# Patient Record
Sex: MALE | Race: WHITE | NOT HISPANIC OR LATINO | Employment: FULL TIME | URBAN - METROPOLITAN AREA
[De-identification: names, ages, dates, MRNs, and addresses within clinical notes are randomized per-mention and may not be internally consistent; named-entity substitution may affect disease eponyms.]

---

## 2018-08-14 ENCOUNTER — TRANSFERRED RECORDS (OUTPATIENT)
Dept: HEALTH INFORMATION MANAGEMENT | Facility: CLINIC | Age: 48
End: 2018-08-14

## 2018-08-29 ENCOUNTER — OFFICE VISIT (OUTPATIENT)
Dept: SLEEP MEDICINE | Facility: CLINIC | Age: 48
End: 2018-08-29
Payer: COMMERCIAL

## 2018-08-29 VITALS
OXYGEN SATURATION: 97 % | WEIGHT: 218.6 LBS | HEART RATE: 96 BPM | RESPIRATION RATE: 16 BRPM | DIASTOLIC BLOOD PRESSURE: 87 MMHG | BODY MASS INDEX: 28.97 KG/M2 | SYSTOLIC BLOOD PRESSURE: 135 MMHG | HEIGHT: 73 IN | TEMPERATURE: 97.9 F

## 2018-08-29 DIAGNOSIS — I63.9 ISCHEMIC STROKE (H): Primary | ICD-10-CM

## 2018-08-29 DIAGNOSIS — R03.0 BORDERLINE HYPERTENSION: ICD-10-CM

## 2018-08-29 DIAGNOSIS — F41.9 ANXIETY: ICD-10-CM

## 2018-08-29 DIAGNOSIS — R06.83 SNORING: ICD-10-CM

## 2018-08-29 DIAGNOSIS — R53.83 FATIGUE, UNSPECIFIED TYPE: ICD-10-CM

## 2018-08-29 PROCEDURE — 99204 OFFICE O/P NEW MOD 45 MIN: CPT | Performed by: PHYSICIAN ASSISTANT

## 2018-08-29 RX ORDER — ASPIRIN 81 MG/1
81 TABLET ORAL
COMMUNITY

## 2018-08-29 NOTE — PROGRESS NOTES
Sleep Consultation:    Date on this visit: 8/29/2018    Kai Allen is sent by No ref. provider found for a sleep consultation regarding NELLY, stroke.    Primary Physician: Teodoro Sams     Kai Allen reports snoring for 10 years or so and recently having a CVA. His medical history is otherwise non-contributory.    He has not thought of his sleep as problematic until his stroke. Even with paying closer attention to his sleep, he has not really felt he has concerns of his sleep.     He had a normal ESTRELLA on 8/17. LVEF was 60%.     Kai goes to sleep at 11:30 PM during the week. He wakes up at 7:00 AM to his kids. He falls asleep in 20 minutes.  Kai denies difficulty falling asleep.  He generally does not wake in the night unless one of his children need him.  On weekends, his sleep schedule is the same.  Patient gets an average of 7 hours of sleep per night. He feels he is a light sleeper. He recently started taking valerian root for anxiety. He has not noticed that it makes him sleepy or helped with anxiety.    Patient does use electronics in bed (was occasionally looking at iPad, has blue filters on but it has kept him up, has been cutting that out) and read in bed and does not watch TV in bed.     Kai does not do shift work.  He works day shifts.  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 He lives with his wife, son (8) and daughter (6).    Kai does snore some nights (3 nights per week on average) and snoring is moderately loud. His snoring is more noticeable if he has alcohol. Patient does have a regular bed partner. There is no report of gasping and snorting.  He does not have witnessed apneas most of the time. He may have some if he had alcohol. They never sleep separately.  Patient sleeps on his back, side and stomach. He snoring is worse if he sleeps on his back. He denies occasional snort arousals, morning dry mouth, morning headaches and morning confusion. Kai has  "occasional bruxism (uses a bite guard) and denies any sleep walking, sleep talking, dream enactment, sleep paralysis, cataplexy and hypnogogic/hypnopompic hallucinations.    Kai denies difficulty breathing through his nose, claustrophobia, reflux at night, heartburn and depression.  He has a lot of stress and possible anxiety related to work. He feels he is often in \"fight or flight\" mode.    Kai has not had a significant change in weight in the last few years.  Patient describes themself as a night person.  He would prefer to go to sleep at 12:00 AM and wake up at 8:00 AM.  Patient's [place] Sleepiness score 0/24 inconsistent with daytime sleepiness.  He had been tired, but has felt more refreshed in the last month since making some changes to his habits.    Kai denies taking naps. He takes no inadvertant naps. He denies dozing while reading in the evening. He denies dozing while driving.  Patient was counseled on the importance of driving while alert, to pull over if drowsy, or nap before getting into the vehicle if sleepy.   He was having 4-5 cups of coffee per day, but stopped caffeine (and alcohol) in the last month.     Allergies:    Allergies   Allergen Reactions     No Known Allergies        Medications:    Current Outpatient Prescriptions   Medication Sig Dispense Refill     aspirin 81 MG EC tablet Take 81 mg by mouth       finasteride (PROPECIA PRO-ANA) 1 MG tablet Take 1 tablet by mouth daily. 30 tablet 6     HYDROcodone-acetaminophen (NORCO) 5-325 MG per tablet Take 1-2 tablets by mouth every 4 hours as needed for other (Moderate to Severe Pain) (Patient not taking: Reported on 8/29/2018) 30 tablet 0     Probiotic Product (PROBIOTIC DAILY PO)        valerian root 530 MG CAPS capsule          Problem List:  Patient Active Problem List    Diagnosis Date Noted     CARDIOVASCULAR SCREENING; LDL GOAL LESS THAN 160 10/31/2010     Priority: Medium     Routine general medical examination at a health care " facility 2008     Priority: Medium     Umbilical hernia 10/06/2003     Priority: Medium     Problem list name updated by automated process. Provider to review          Past Medical/Surgical History:  Past Medical History:   Diagnosis Date     Alopecia, unspecified      Sebaceous cyst     L upper face     Umbilical hernia without mention of obstruction or gangrene 02     Past Surgical History:   Procedure Laterality Date     C NONSPECIFIC PROCEDURE  ~95    per pt - Hosp for viral inf/dehydration     C NONSPECIFIC PROCEDURE  high school    rt c lymph node bx     HERNIORRHAPHY VENTRAL N/A 2015    Procedure: HERNIORRHAPHY VENTRAL;  Surgeon: Clinton Wisdom MD;  Location: [facility]       Social History:  Social History     Social History     Marital status:      Spouse name: N/A     Number of children: 0     Years of education: N/A     Occupational History           Social History Main Topics     Smoking status: Never Smoker     Smokeless tobacco: Never Used     Alcohol use Yes      Comment: 4 beers /week     Drug use: Not on file     Sexual activity: Yes     Partners: Female      Comment:  for 2 years     Other Topics Concern     Exercise No     willbegin     Social History Narrative     2006       Family History:  Family History   Problem Relation Age of Onset     Diabetes Maternal Grandmother      C.A.D. Maternal Grandmother      Arthritis Maternal Grandmother      HEART DISEASE Maternal Grandmother      CABG at 81 yo     Cancer Maternal Grandfather       of lung ca smoker       Review of Systems:  A complete review of systems reviewed by me is negative with the exeption of what has been mentioned in the history of present illness.  CONSTITUTIONAL: NEGATIVE for weight gain/loss, fever, chills, sweats or night sweats, drug allergies.  EYES: NEGATIVE for changes in vision, blind spots, double vision.  ENT: NEGATIVE for ear pain, sore throat, sinus pain, post-nasal  "drip, runny nose, bloody nose  CARDIAC: NEGATIVE for fast heartbeats or fluttering in chest, chest pain or pressure, breathlessness when lying flat, swollen legs or swollen feet.  NEUROLOGIC: NEGATIVE headaches, weakness or numbness in the arms or legs.  DERMATOLOGIC: NEGATIVE for rashes, new moles or change in mole(s)  PULMONARY: NEGATIVE SOB at rest, SOB with activity, dry cough, productive cough, coughing up blood, wheezing or whistling when breathing.    GASTROINTESTINAL: NEGATIVE for nausea or vomitting, loose or watery stools, fat or grease in stools, constipation, abdominal pain, bowel movements black in color or blood noted.  GENITOURINARY: NEGATIVE for pain during urination, blood in urine, urinating more frequently than usual, irregular menstrual periods.  MUSCULOSKELETAL: NEGATIVE for muscle pain, bone or joint pain, swollen joints.  ENDOCRINE: NEGATIVE for increased thirst or urination, diabetes.  LYMPHATIC: NEGATIVE for swollen lymph nodes, lumps or bumps in the breasts or nipple discharge.  MENTAL HEALTH: POSITIVE for stress/anxiety    Physical Examination:  Vitals: /87  Pulse 96  Temp 97.9  F (36.6  C) (Oral)  Resp 16  Ht 1.854 m (6' 1\")  Wt 99.2 kg (218 lb 9.6 oz)  SpO2 97%  BMI 28.84 kg/m2  BMI= Body mass index is 28.84 kg/(m^2).    Neck Cir (cm): 39 cm    [place] Total Score 8/29/2018   Total score - [place] 0       SILAS Total Score: 5 (08/29/18 1254)    GENERAL APPEARANCE: healthy, alert, no distress and cooperative  EYES: Eyes grossly normal to inspection, PERRL, conjunctivae and sclerae normal and lids and lashes normal  HENT: nose and mouth without ulcers or lesions, crowded oropharynx and soft palate dependent  NECK: no adenopathy, no asymmetry, masses, or scars, thyroid normal to palpation and trachea midline and normal to palpation  RESP: lungs clear to auscultation - no rales, rhonchi or wheezes  CV: regular rates and rhythm, normal S1 S2, no S3 or S4, no murmur, click or rub " and no irregular beats  LYMPHATICS: no cervical adenopathy  MS: extremities normal- no gross deformities noted  NEURO: Normal strength and tone, mentation intact, speech normal and cranial nerves 2-12 intact  Mallampati Class: III.  Tonsillar Stage: 1  hidden by pillars.    Impression/Plan:    (I63.9) Ischemic stroke (H)  (primary encounter diagnosis), (R06.83) Snoring, (R03.0) Borderline hypertension, (R53.83) Fatigue, unspecified type  Comment: Mr. Allen presents at the request of Dr. Erwin for evaluation of possible sleep apnea in the setting of ischemic stroke which occurred one month ago. He does snore but not every night. Alcohol and sleeping supine exacerbate his snoring. His wife may have observed pauses in breathing if he has alcohol. He usually only has 1-2 drinks a couple times per week. He stopped all alcohol since his stroke. He has felt tired and unrefreshed from sleep in the past. He was having 4-5 cups of coffee per day to help get him through the day. He has felt a bit better since cutting out the coffee. He denies inadvertent dozing. His ESS is 0/24. He has had borderline hypertensive blood pressure readings, but has not been diagnosed with or treated for HTN. Other positive risk factors include; age 48 and make gender. His BMI is 28 and neck 39 cm, which does not confer high risk for apnea based on the STOP BANG scale. He does have a dependent soft palate, crowding his oropharynx.  Plan: Comprehensive Sleep Study        Split night PSG with CPAP/BiPAP titration if indicated.       (F41.9) Anxiety  Comment: He has stress and anxiety related to work primarily and asks for referral to counseling.  Plan: MENTAL HEALTH REFERRAL  - Adult; Outpatient         Treatment; Individual/Couples/Family/Group    Therapy/Health Psychology; [facility] [place]: [facility] [phone]; We will contact you to schedule the appointment or please call with any questions        Literature provided regarding sleep  apnea.      He will follow up with me in approximately two weeks after his sleep study has been competed to review the results and discuss plan of care.       Polysomnography reviewed.  Obstructive sleep apnea reviewed.  Complications of untreated sleep apnea were reviewed.  45 minutes was spent during this visit, over 50% in counseling and coordination of care.   Bennett Goltz, PA-C      CC: Bubba Erwin MD

## 2018-08-29 NOTE — NURSING NOTE
"Chief Complaint   Patient presents with     Sleep Problem     \"Small stroke in late July; reported snoring for years.\"       Initial /87  Pulse 96  Temp 97.9  F (36.6  C) (Oral)  Resp 16  Ht 1.854 m (6' 1\")  Wt 99.2 kg (218 lb 9.6 oz)  SpO2 97%  BMI 28.84 kg/m2 Estimated body mass index is 28.84 kg/(m^2) as calculated from the following:    Height as of this encounter: 1.854 m (6' 1\").    Weight as of this encounter: 99.2 kg (218 lb 9.6 oz).    Medication Reconciliation: complete     ESS 0  Neck 39cm  Saray Arguello        "

## 2018-08-29 NOTE — PATIENT INSTRUCTIONS
"MY TREATMENT INFORMATION FOR SLEEP APNEA-  Kai Allen    DOCTOR : Bennett Ezra Goltz, PA-C  SLEEP CENTER : [facility]     MY CONTACT NUMBER: [phone]    Am I having a sleep study at a sleep center?  Make sure you have an appointment for the study before you leave!    Am I having a home sleep study?  Watch this video:  https://www.Inspivia.com/watch?v=CteI_GhyP9g&[email or web address]  Please verify your insurance coverage with your insurance carrier    Frequently asked questions:  1. What is Obstructive Sleep Apnea (NELLY)? NELLY is the most common type of sleep apnea. Apnea means, \"without breath.\"  Apnea is most often caused by narrowing or collapse of the upper airway as muscles relax during sleep.   Almost everyone has occasional apneas. Most people with sleep apnea have had brief interruptions at night frequently for many years.  The severity of sleep apnea is related to how frequent and severe the events are.   2. What are the consequences of NELLY? Symptoms include: feeling sleepy during the day, snoring loudly, gasping or stopping of breathing, trouble sleeping, and occasionally morning headaches or heartburn at night.  Sleepiness can be serious and even increase the risk of falling asleep while driving. Other health consequences may include development of high blood pressure and other cardiovascular disease in persons who are susceptible. Untreated NELLY  can contribute to heart disease, stroke and diabetes.   3. What are the treatment options? In most situations, sleep apnea is a lifelong disease that must be managed with daily therapy. Medications are not effective for sleep apnea and surgery is generally not considered until other therapies have been tried. Your treatment is your choice . Continuous Positive Airway (CPAP) works right away and is the therapy that is effective in nearly everyone. An oral device to hold your jaw forward is usually the next most reliable option. Other options " include postioning devices (to keep you off your back), weight loss, and surgery including a tongue pacing device. There is more detail about some of these options below.    Important tips for using CPAP and similar devices   Know your equipment:  CPAP is continuous positive airway pressure that prevents obstructive sleep apnea by keeping the throat from collapsing while you are sleeping. In most cases, the device is  smart  and can slowly self-adjusts if your throat collapses and keeps a record every day of how well you are treated-this information is available to you and your care team.  BPAP is bilevel positive airway pressure that keeps your throat open and also assists each breath with a pressure boost to maintain adequate breathing.  Special kinds of BPAP are used in patients who have inadequate breathing from lung or heart disease. In most cases, the device is  smart  and can slowly self-adjusts to assist breathing. Like CPAP, the device keeps a record of how well you are treated.  Your mask is your connection to the device. You get to choose what feels most comfortable and the staff will help to make sure if fits. Here: are some examples of the different masks that are available:       Key points to remember on your journey with sleep apnea:  1. Sleep study.  PAP devices often need to be adjusted during a sleep study to show that they are effective and adjusted right.  2. Good tips to remember: Try wearing just the mask during a quiet time during the day so your body adapts to wearing it. A humidifier is recommended for comfort in most cases to prevent drying of your nose and throat. Allergy medication from your provider may help you if you are having nasal congestion.  3. Getting settled-in. It takes more than one night for most of us to get used to wearing a mask. Try wearing just the mask during a quiet time during the day so your body adapts to wearing it. A humidifier is recommended for comfort in most  cases. Our team will work with you carefully on the first day and will be in contact within 4 days and again at 2 and 4 weeks for advice and remote device adjustments. Your therapy is evaluated by the device each day.   4. Use it every night. The more you are able to sleep naturally for 7-8 hours, the more likely you will have good sleep and to prevent health risks or symptoms from sleep apnea. Even if you use it 4 hours it helps. Occasionally all of us are unable to use a medical therapy, in sleep apnea, it is not dangerous to miss one night.   5. Communicate. Call our skilled team on the number provided on the first day if your visit for problems that make it difficult to wear the device. Over 2 out of 3 patients can learn to wear the device long-term with help from our team. Remember to call our team or your sleep providers if you are unable to wear the device as we may have other solutions for those who cannot adapt to mask CPAP therapy. It is recommended that you sleep your sleep provider within the first 3 months and yearly after that if you are not having problems.   Take care of your equipment. Make sure you clean your mask and tubing using directions every day and that your filter and mask are replaced as recommended or if they are not working.     BESIDES CPAP, WHAT OTHER THERAPIES ARE THERE?    Positioning Device  Positioning devices are generally used when sleep apnea is mild and only occurs on your back.This example shows a pillow that straps around the waist. It may be appropriate for those whose sleep study shows milder sleep apnea that occurs primarily when lying flat on one's back. Preliminary studies have shown benefit but effectiveness at home may need to be verified by a home sleep test. These devices are generally not covered by medical insurance.  Examples of devices that maintain sleeping on the back to prevent snoring and mild sleep apnea.    Belt type body  positioner  Http://Knock Knock.Capital Float/    Electronic reminder  Http://nightshifttherapy.com/  Http://www.Towi.com.au/    Oral Appliance  What is oral appliance therapy?  An oral appliance device fits on your teeth at night like a retainer used after having braces. The device is made by a specialized dentist and requires several visits over 1-2 months before a manufactured device is made to fit your teeth and is adjusted to prevent your sleep apnea. Once an oral device is working properly, snoring should be improved. A home sleep test may be recommended at that time if to determine whether the sleep apnea is adequately treated.       Some things to remember:  -Oral devices are often, but not always, covered by your medical insurance. Be sure to check with your insurance provider.   -If you are referred for oral therapy, you will be given a list of specialized dentists to consider or you may choose to visit the Web site of the American Academy of Dental Sleep Medicine  -Oral devices are less likely to work if you have severe sleep apnea or are extremely overweight.     More detailed information  An oral appliance is a small acrylic device that fits over the upper and lower teeth  (similar to a retainer or a mouth guard). This device slightly moves jaw forward, which moves the base of the tongue forward, opens the airway, improves breathing for effective treat snoring and obstructive sleep apnea in perhaps 7 out of 10 people .  The best working devices are custom-made by a dental device  after a mold is made of the teeth 1, 2, 3.  When is an oral appliance indicated?  Oral appliance therapy is recommended as a first-line treatment for patients with primary snoring, mild sleep apnea, and for patients with moderate sleep apnea who prefer appliance therapy to use of CPAP4, 5. Severity of sleep apnea is determined by sleep testing and is based on the number of respiratory events per hour of sleep.   How successful  is oral appliance therapy?  The success rate of oral appliance therapy in patients with mild sleep apnea is 75-80% while in patients with moderate sleep apnea it is 50-70%. The chance of success in patients with severe sleep apnea is 40-50%. The research also shows that oral appliances have a beneficial effect on the cardiovascular health of NELLY patients at the same magnitude as CPAP therapy7.  Oral appliances should be a second-line treatment in cases of severe sleep apnea, but if not completely successful then a combination therapy utilizing CPAP plus oral appliance therapy may be effective. Oral appliances tend to be effective in a broad range of patients although studies show that the patients who have the highest success are females, younger patients, those with milder disease, and less severe obesity. 3, 6.   Finding a dentist that practices dental sleep medicine  Specific training is available through the American Academy of Dental Sleep Medicine for dentists interested in working in the field of sleep. To find a dentist who is educated in the field of sleep and the use of oral appliances, near you, visit the Web site of the American Academy of Dental Sleep Medicine.    References  1. Samantha et al. Objectively measured vs self-reported compliance during oral appliance therapy for sleep-disordered breathing. Chest 2013; 144(5): 1281-3046.  2. Ji et al. Objective measurement of compliance during oral appliance therapy for sleep-disordered breathing. Thorax 2013; 68(1): 91-96.  3. Kevan, et al. Mandibular advancement devices in 620 men and women with NELLY and snoring: tolerability and predictors of treatment success. Chest 2004; 125: 5063-9388.  4. Luis Enrique, et al. Oral appliances for snoring and NELLY: a review. Sleep 2006; 29: 244-262.  5. Edmar, et al. Oral appliance treatment for NELLY: an update. J Clin Sleep Med 2014; 10(2): 215-227.  6. Corky et al. Predictors of OSAH treatment  outcome. J Kane Res 2007; 86: 4686-0754.    Weight Loss:    Weight loss is a long-term strategy that may improve sleep apnea in some patients.    Weight management is a personal decision and the decision should be based on your interest and the potential benefits.  If you are interested in exploring weight loss strategies, the following discussion covers the impact on weight loss on sleep apnea and the approaches that may be successful.    Being overweight does not necessarily mean you will have health consequences.  Those who have BMI over 35 or over 27 with existing medical conditions carries greater risk.   Weight loss decreases severity of sleep apnea in most people with obesity. For those with mild obesity who have developed snoring with weight gain, even 15-30 pound weight loss can improve and occasionally eliminate sleep apnea.  Structured and life-long dietary and health habits are necessary to lose weight and keep healthier weight levels.     Though there may be significant health benefits from weight loss, long-term weight loss is very difficult to achieve- studies show success with dietary management in less than 10% of people. In addition, substantial weight loss may require years of dietary control and may be difficult if patients have severe obesity. In these cases, surgical management may be considered.  Finally, older individuals who have tolerated obesity without health complications may be less likely to benefit from weight loss strategies.      Your BMI is Body mass index is 28.84 kg/(m^2).  Weight management is a personal decision.  If you are interested in exploring weight loss strategies, the following discussion covers the approaches that may be successful. Body mass index (BMI) is one way to tell whether you are at a healthy weight, overweight, or obese. It measures your weight in relation to your height.  A BMI of 18.5 to 24.9 is in the healthy range. A person with a BMI of 25 to 29.9 is  considered overweight, and someone with a BMI of 30 or greater is considered obese. More than two-thirds of American adults are considered overweight or obese.  Being overweight or obese increases the risk for further weight gain. Excess weight may lead to heart disease and diabetes.  Creating and following plans for healthy eating and physical activity may help you improve your health.  Weight control is part of healthy lifestyle and includes exercise, emotional health, and healthy eating habits. Careful eating habits lifelong are the mainstay of weight control. Though there are significant health benefits from weight loss, long-term weight loss with diet alone may be very difficult to achieve- studies show long-term success with dietary management in less than 10% of people. Attaining a healthy weight may be especially difficult to achieve in those with severe obesity. In some cases, medications, devices and surgical management might be considered.  What can you do?  If you are overweight or obese and are interested in methods for weight loss, you should discuss this with your provider.     Consider reducing daily calorie intake by 500 calories.     Keep a food journal.     Avoiding skipping meals, consider cutting portions instead.    Diet combined with exercise helps maintain muscle while optimizing fat loss. Strength training is particularly important for building and maintaining muscle mass. Exercise helps reduce stress, increase energy, and improves fitness. Increasing exercise without diet control, however, may not burn enough calories to loose weight.       Start walking three days a week 10-20 minutes at a time    Work towards walking thirty minutes five days a week     Eventually, increase the speed of your walking for 1-2 minutes at time    In addition, we recommend that you review healthy lifestyles and methods for weight loss available through the National Institutes of Health patient information  sites:  http://win.niddk.nih.gov/publications/index.htm    And look into health and wellness programs that may be available through your health insurance provider, employer, local community center, or leo club.    Weight management plan: Patient was referred to their PCP to discuss a diet and exercise plan.    Surgery:  Surgery for obstructive sleep apnea is considered generally only when other therapies fail to work. Surgery may be discussed with you if you are having a difficult time tolerating CPAP and or when there is an abnormal structure that requires surgical correction.  Nose and throat surgeries often enlarge the airway to prevent collapse.  Most of these surgeries create pain for 1-2 weeks and up to half of the most common surgeries are not effective throughout life.  You should carefully discuss the benefits and drawbacks to surgery with your sleep provider and surgeon to determine if it is the best solution for you.   More information  Surgery for NELLY is directed at areas that are responsible for narrowing or complete obstruction of the airway during sleep.  There are a wide range of procedures available to enlarge and/or stabilize the airway to prevent blockage of breathing in the three major areas where it can occur: the palate, tongue, and nasal regions.  Successful surgical treatment depends on the accurate identification of the factors responsible for obstructive sleep apnea in each person.  A personalized approach is required because there is no single treatment that works well for everyone.  Because of anatomic variation, consultation with an examination by a sleep surgeon is a critical first step in determining what surgical options are best for each patient.  In some cases, examination during sedation may be recommended in order to guide the selection of procedures.  Patients will be counseled about risks and benefits as well as the typical recovery course after surgery. Surgery is typically  not a cure for a person s NELLY.  However, surgery will often significantly improve one s NELLY severity (termed  success rate ).  Even in the absence of a cure, surgery will decrease the cardiovascular risk associated with OSA7; improve overall quality of life8 (sleepiness, functionality, sleep quality, etc).  Palate Procedures:  Patients with NELLY often have narrowing of their airway in the region of their tonsils and uvula.  The goals of palate procedures are to widen the airway in this region as well as to help the tissues resist collapse.  Modern palate procedure techniques focus on tissue conservation and soft tissue rearrangement, rather than tissue removal.  Often the uvula is preserved in this procedure. Residual sleep apnea is common in patient after pharyngoplasty with an average reduction in sleep apnea events of 33%2.    Tongue Procedures:  ExamWhile patients are awake, the muscles that surround the throat are active and keep this region open for breathing. These muscles relax during sleep, allowing the tongue and other structures to collapse and block breathing.  There are several different tongue procedures available.  Selection of a tongue base procedure depends on characteristics seen on physical exam.  Generally, procedures are aimed at removing bulky tissues in this area or preventing the back of the tongue from falling back during sleep.  Success rates for tongue surgery range from 50-62%3.  Hypoglossal Nerve Stimulation:  Hypoglossal nerve stimulation has recently received approval from the [place] Food and Drug Administration for the treatment of obstructive sleep apnea.  This is based on research showing that the system was safe and effective in treating sleep apnea6.  Results showed that the median AHI score decreased 68%, from 29.3 to 9.0. This therapy uses an implant system that senses breathing patterns and delivers mild stimulation to airway muscles, which keeps the airway open during  sleep.  The system consists of three fully implanted components: a small generator (similar in size to a pacemaker), a breathing sensor, and a stimulation lead.  Using a small handheld remote, a patient turns the therapy on before bed and off upon awakening.    Candidates for this device must be greater than 22 years of age, have moderate to severe NELLY (AHI between 20-65), BMI less than 32, have tried CPAP/oral appliance without success, and have appropriate upper airway anatomy (determined by a sleep endoscopy performed by Dr. Morales).  Hypoglossal Nerve Stimulation Pathway:    The sleep surgeon s office will work with the patient through the insurance prior-authorization process (including communications and appeals).    Nasal Procedures:  Nasal obstruction can interfere with nasal breathing during the day and night.  Studies have shown that relief of nasal obstruction can improve the ability of some patients to tolerate positive airway pressure therapy for obstructive sleep apnea1.  Treatment options include medications such as nasal saline, topical corticosteroid and antihistamine sprays, and oral medications such as antihistamines or decongestants. Non-surgical treatments can include external nasal dilators for selected patients. If these are not successful by themselves, surgery can improve the nasal airway either alone or in combination with these other options.  Combination Procedures:  Combination of surgical procedures and other treatments may be recommended, particularly if patients have more than one area of narrowing or persistent positional disease.  The success rate of combination surgery ranges from 66-80%2,3.    References  1. Vikki CARTER. The Role of the Nose in Snoring and Obstructive Sleep Apnoea: An Update.  Eur Arch Otorhinolaryngol. 2011; 268: 1365-73.  2.  Yandy SM; Brandi JA; Jason JR; Pallanch JF; Karen BREWSTER; Radha NAVA; Caleb BANDA. Surgical modifications of the upper airway for obstructive  sleep apnea in adults: a systematic review and meta-analysis. SLEEP 2010;33(10):8444-4380. Oliva CARR. Hypopharyngeal surgery in obstructive sleep apnea: an evidence-based medicine review.  Arch Otolaryngol Head Neck Surg. 2006 Feb;132(2):206-13.  3. Seth YH1, Margaret Y, Eliezer KATHIA. The efficacy of anatomically based multilevel surgery for obstructive sleep apnea. Otolaryngol Head Neck Surg. 2003 Oct;129(4):327-35.  4. Kezirian E, Goldberg A. Hypopharyngeal Surgery in Obstructive Sleep Apnea: An Evidence-Based Medicine Review. Arch Otolaryngol Head Neck Surg. 2006 Feb;132(2):206-13.  5. Mary BOWLING et al. Upper-Airway Stimulation for Obstructive Sleep Apnea.  N Engl J Med. 2014 Jan 9;370(2):139-49.  6. Charlee Y et al. Increased Incidence of Cardiovascular Disease in Middle-aged Men with Obstructive Sleep Apnea. Am J Respir Crit Care Med; 2002 166: 159-165  7. Red EM et al. Studying Life Effects and Effectiveness of Palatopharyngoplasty (SLEEP) study: Subjective Outcomes of Isolated Uvulopalatopharyngoplasty. Otolaryngol Head Neck Surg. 2011; 144: 623-631.    Counseling / Behavioral Health  [facility]  Visit [email or web address] or call [phone] to find a clinic close to you.  Or call [phone] for [facility].

## 2018-08-29 NOTE — MR AVS SNAPSHOT
"              After Visit Summary   8/29/2018    Kai Allen    MRN: [id number]           Patient Information     Date Of Birth          1970        Visit Information        Provider Department      8/29/2018 1:00 PM Goltz, Bennett Ezra, PA-C [facility] [place]        Today's Diagnoses     Ischemic stroke (H)    -  1    Snoring        Borderline hypertension        Fatigue, unspecified type          Care Instructions    MY TREATMENT INFORMATION FOR SLEEP APNEA-  Kai Allen    DOCTOR : Bennett Ezra Goltz, PA-C  SLEEP CENTER : [place]     MY CONTACT NUMBER: [phone]    Am I having a sleep study at a sleep center?  Make sure you have an appointment for the study before you leave!    Am I having a home sleep study?  Watch this video:  [email or web address]/watch?v=CteI_[email or web address]&[email or web address]  Please verify your insurance coverage with your insurance carrier    Frequently asked questions:  1. What is Obstructive Sleep Apnea (NELLY)? NELLY is the most common type of sleep apnea. Apnea means, \"without breath.\"  Apnea is most often caused by narrowing or collapse of the upper airway as muscles relax during sleep.   Almost everyone has occasional apneas. Most people with sleep apnea have had brief interruptions at night frequently for many years.  The severity of sleep apnea is related to how frequent and severe the events are.   2. What are the consequences of NELLY? Symptoms include: feeling sleepy during the day, snoring loudly, gasping or stopping of breathing, trouble sleeping, and occasionally morning headaches or heartburn at night.  Sleepiness can be serious and even increase the risk of falling asleep while driving. Other health consequences may include development of high blood pressure and other cardiovascular disease in persons who are susceptible. Untreated NELLY  can contribute to heart disease, stroke and diabetes.   3. What are the treatment options? In most " situations, sleep apnea is a lifelong disease that must be managed with daily therapy. Medications are not effective for sleep apnea and surgery is generally not considered until other therapies have been tried. Your treatment is your choice . Continuous Positive Airway (CPAP) works right away and is the therapy that is effective in nearly everyone. An oral device to hold your jaw forward is usually the next most reliable option. Other options include postioning devices (to keep you off your back), weight loss, and surgery including a tongue pacing device. There is more detail about some of these options below.    Important tips for using CPAP and similar devices   Know your equipment:  CPAP is continuous positive airway pressure that prevents obstructive sleep apnea by keeping the throat from collapsing while you are sleeping. In most cases, the device is  smart  and can slowly self-adjusts if your throat collapses and keeps a record every day of how well you are treated-this information is available to you and your care team.  BPAP is bilevel positive airway pressure that keeps your throat open and also assists each breath with a pressure boost to maintain adequate breathing.  Special kinds of BPAP are used in patients who have inadequate breathing from lung or heart disease. In most cases, the device is  smart  and can slowly self-adjusts to assist breathing. Like CPAP, the device keeps a record of how well you are treated.  Your mask is your connection to the device. You get to choose what feels most comfortable and the staff will help to make sure if fits. Here: are some examples of the different masks that are available:       Key points to remember on your journey with sleep apnea:  1. Sleep study.  PAP devices often need to be adjusted during a sleep study to show that they are effective and adjusted right.  2. Good tips to remember: Try wearing just the mask during a quiet time during the day so your body  adapts to wearing it. A humidifier is recommended for comfort in most cases to prevent drying of your nose and throat. Allergy medication from your provider may help you if you are having nasal congestion.  3. Getting settled-in. It takes more than one night for most of us to get used to wearing a mask. Try wearing just the mask during a quiet time during the day so your body adapts to wearing it. A humidifier is recommended for comfort in most cases. Our team will work with you carefully on the first day and will be in contact within 4 days and again at 2 and 4 weeks for advice and remote device adjustments. Your therapy is evaluated by the device each day.   4. Use it every night. The more you are able to sleep naturally for 7-8 hours, the more likely you will have good sleep and to prevent health risks or symptoms from sleep apnea. Even if you use it 4 hours it helps. Occasionally all of us are unable to use a medical therapy, in sleep apnea, it is not dangerous to miss one night.   5. Communicate. Call our skilled team on the number provided on the first day if your visit for problems that make it difficult to wear the device. Over 2 out of 3 patients can learn to wear the device long-term with help from our team. Remember to call our team or your sleep providers if you are unable to wear the device as we may have other solutions for those who cannot adapt to mask CPAP therapy. It is recommended that you sleep your sleep provider within the first 3 months and yearly after that if you are not having problems.   Take care of your equipment. Make sure you clean your mask and tubing using directions every day and that your filter and mask are replaced as recommended or if they are not working.     BESIDES CPAP, WHAT OTHER THERAPIES ARE THERE?    Positioning Device  Positioning devices are generally used when sleep apnea is mild and only occurs on your back.This example shows a pillow that straps around the waist. It  may be appropriate for those whose sleep study shows milder sleep apnea that occurs primarily when lying flat on one's back. Preliminary studies have shown benefit but effectiveness at home may need to be verified by a home sleep test. These devices are generally not covered by medical insurance.  Examples of devices that maintain sleeping on the back to prevent snoring and mild sleep apnea.    Belt type body positioner  Http://[employer].com/    Electronic reminder  Http://nightshifttherapy.com/  Http://www.Reclog.CallsFreeCalls.au/    Oral Appliance  What is oral appliance therapy?  An oral appliance device fits on your teeth at night like a retainer used after having braces. The device is made by a specialized dentist and requires several visits over 1-2 months before a manufactured device is made to fit your teeth and is adjusted to prevent your sleep apnea. Once an oral device is working properly, snoring should be improved. A home sleep test may be recommended at that time if to determine whether the sleep apnea is adequately treated.       Some things to remember:  -Oral devices are often, but not always, covered by your medical insurance. Be sure to check with your insurance provider.   -If you are referred for oral therapy, you will be given a list of specialized dentists to consider or you may choose to visit the Web site of the American Academy of Dental Sleep Medicine  -Oral devices are less likely to work if you have severe sleep apnea or are extremely overweight.     More detailed information  An oral appliance is a small acrylic device that fits over the upper and lower teeth  (similar to a retainer or a mouth guard). This device slightly moves jaw forward, which moves the base of the tongue forward, opens the airway, improves breathing for effective treat snoring and obstructive sleep apnea in perhaps 7 out of 10 people .  The best working devices are custom-made by a dental device  after a mold is  made of the teeth 1, 2, 3.  When is an oral appliance indicated?  Oral appliance therapy is recommended as a first-line treatment for patients with primary snoring, mild sleep apnea, and for patients with moderate sleep apnea who prefer appliance therapy to use of CPAP4, 5. Severity of sleep apnea is determined by sleep testing and is based on the number of respiratory events per hour of sleep.   How successful is oral appliance therapy?  The success rate of oral appliance therapy in patients with mild sleep apnea is 75-80% while in patients with moderate sleep apnea it is 50-70%. The chance of success in patients with severe sleep apnea is 40-50%. The research also shows that oral appliances have a beneficial effect on the cardiovascular health of NELLY patients at the same magnitude as CPAP therapy7.  Oral appliances should be a second-line treatment in cases of severe sleep apnea, but if not completely successful then a combination therapy utilizing CPAP plus oral appliance therapy may be effective. Oral appliances tend to be effective in a broad range of patients although studies show that the patients who have the highest success are females, younger patients, those with milder disease, and less severe obesity. 3, 6.   Finding a dentist that practices dental sleep medicine  Specific training is available through the American Academy of Dental Sleep Medicine for dentists interested in working in the field of sleep. To find a dentist who is educated in the field of sleep and the use of oral appliances, near you, visit the Web site of the American Academy of Dental Sleep Medicine.    References  1. Samantha, et al. Objectively measured vs self-reported compliance during oral appliance therapy for sleep-disordered breathing. Chest 2013; 144(5): 9721-2720.  2. Ji et al. Objective measurement of compliance during oral appliance therapy for sleep-disordered breathing. Thorax 2013; 68(1): 91-96.  3. Kevan  et al. Mandibular advancement devices in 620 men and women with NELLY and snoring: tolerability and predictors of treatment success. Chest 2004; 125: 1476-5252.  4. Luis Enrique et al. Oral appliances for snoring and NELLY: a review. Sleep 2006; 29: 244-262.  5. Edmar et al. Oral appliance treatment for NELLY: an update. J Clin Sleep Med 2014; 10(2): 215-227.  6. Corky et al. Predictors of OSAH treatment outcome. J Dent Res 2007; 86: 1857-1828.    Weight Loss:    Weight loss is a long-term strategy that may improve sleep apnea in some patients.    Weight management is a personal decision and the decision should be based on your interest and the potential benefits.  If you are interested in exploring weight loss strategies, the following discussion covers the impact on weight loss on sleep apnea and the approaches that may be successful.    Being overweight does not necessarily mean you will have health consequences.  Those who have BMI over 35 or over 27 with existing medical conditions carries greater risk.   Weight loss decreases severity of sleep apnea in most people with obesity. For those with mild obesity who have developed snoring with weight gain, even 15-30 pound weight loss can improve and occasionally eliminate sleep apnea.  Structured and life-long dietary and health habits are necessary to lose weight and keep healthier weight levels.     Though there may be significant health benefits from weight loss, long-term weight loss is very difficult to achieve- studies show success with dietary management in less than 10% of people. In addition, substantial weight loss may require years of dietary control and may be difficult if patients have severe obesity. In these cases, surgical management may be considered.  Finally, older individuals who have tolerated obesity without health complications may be less likely to benefit from weight loss strategies.      Your BMI is Body mass index is 28.84  kg/(m^2).  Weight management is a personal decision.  If you are interested in exploring weight loss strategies, the following discussion covers the approaches that may be successful. Body mass index (BMI) is one way to tell whether you are at a healthy weight, overweight, or obese. It measures your weight in relation to your height.  A BMI of 18.5 to 24.9 is in the healthy range. A person with a BMI of 25 to 29.9 is considered overweight, and someone with a BMI of 30 or greater is considered obese. More than two-thirds of American adults are considered overweight or obese.  Being overweight or obese increases the risk for further weight gain. Excess weight may lead to heart disease and diabetes.  Creating and following plans for healthy eating and physical activity may help you improve your health.  Weight control is part of healthy lifestyle and includes exercise, emotional health, and healthy eating habits. Careful eating habits lifelong are the mainstay of weight control. Though there are significant health benefits from weight loss, long-term weight loss with diet alone may be very difficult to achieve- studies show long-term success with dietary management in less than 10% of people. Attaining a healthy weight may be especially difficult to achieve in those with severe obesity. In some cases, medications, devices and surgical management might be considered.  What can you do?  If you are overweight or obese and are interested in methods for weight loss, you should discuss this with your provider.     Consider reducing daily calorie intake by 500 calories.     Keep a food journal.     Avoiding skipping meals, consider cutting portions instead.    Diet combined with exercise helps maintain muscle while optimizing fat loss. Strength training is particularly important for building and maintaining muscle mass. Exercise helps reduce stress, increase energy, and improves fitness. Increasing exercise without diet  control, however, may not burn enough calories to loose weight.       Start walking three days a week 10-20 minutes at a time    Work towards walking thirty minutes five days a week     Eventually, increase the speed of your walking for 1-2 minutes at time    In addition, we recommend that you review healthy lifestyles and methods for weight loss available through the National Institutes of Health patient information sites:  http://win.niddk.nih.gov/publications/index.htm    And look into health and wellness programs that may be available through your health insurance provider, employer, local community center, or leo club.    Weight management plan: Patient was referred to their PCP to discuss a diet and exercise plan.    Surgery:  Surgery for obstructive sleep apnea is considered generally only when other therapies fail to work. Surgery may be discussed with you if you are having a difficult time tolerating CPAP and or when there is an abnormal structure that requires surgical correction.  Nose and throat surgeries often enlarge the airway to prevent collapse.  Most of these surgeries create pain for 1-2 weeks and up to half of the most common surgeries are not effective throughout life.  You should carefully discuss the benefits and drawbacks to surgery with your sleep provider and surgeon to determine if it is the best solution for you.   More information  Surgery for NELLY is directed at areas that are responsible for narrowing or complete obstruction of the airway during sleep.  There are a wide range of procedures available to enlarge and/or stabilize the airway to prevent blockage of breathing in the three major areas where it can occur: the palate, tongue, and nasal regions.  Successful surgical treatment depends on the accurate identification of the factors responsible for obstructive sleep apnea in each person.  A personalized approach is required because there is no single treatment that works well for  everyone.  Because of anatomic variation, consultation with an examination by a sleep surgeon is a critical first step in determining what surgical options are best for each patient.  In some cases, examination during sedation may be recommended in order to guide the selection of procedures.  Patients will be counseled about risks and benefits as well as the typical recovery course after surgery. Surgery is typically not a cure for a person s NELLY.  However, surgery will often significantly improve one s NELLY severity (termed  success rate ).  Even in the absence of a cure, surgery will decrease the cardiovascular risk associated with OSA7; improve overall quality of life8 (sleepiness, functionality, sleep quality, etc).  Palate Procedures:  Patients with NELLY often have narrowing of their airway in the region of their tonsils and uvula.  The goals of palate procedures are to widen the airway in this region as well as to help the tissues resist collapse.  Modern palate procedure techniques focus on tissue conservation and soft tissue rearrangement, rather than tissue removal.  Often the uvula is preserved in this procedure. Residual sleep apnea is common in patient after pharyngoplasty with an average reduction in sleep apnea events of 33%2.    Tongue Procedures:  ExamWhile patients are awake, the muscles that surround the throat are active and keep this region open for breathing. These muscles relax during sleep, allowing the tongue and other structures to collapse and block breathing.  There are several different tongue procedures available.  Selection of a tongue base procedure depends on characteristics seen on physical exam.  Generally, procedures are aimed at removing bulky tissues in this area or preventing the back of the tongue from falling back during sleep.  Success rates for tongue surgery range from 50-62%3.  Hypoglossal Nerve Stimulation:  Hypoglossal nerve stimulation has recently received approval from  the [place] Food and Drug Administration for the treatment of obstructive sleep apnea.  This is based on research showing that the system was safe and effective in treating sleep apnea6.  Results showed that the median AHI score decreased 68%, from 29.3 to 9.0. This therapy uses an implant system that senses breathing patterns and delivers mild stimulation to airway muscles, which keeps the airway open during sleep.  The system consists of three fully implanted components: a small generator (similar in size to a pacemaker), a breathing sensor, and a stimulation lead.  Using a small handheld remote, a patient turns the therapy on before bed and off upon awakening.    Candidates for this device must be greater than 22 years of age, have moderate to severe NELLY (AHI between 20-65), BMI less than 32, have tried CPAP/oral appliance without success, and have appropriate upper airway anatomy (determined by a sleep endoscopy performed by Dr. Morales).  Hypoglossal Nerve Stimulation Pathway:    The sleep surgeon s office will work with the patient through the insurance prior-authorization process (including communications and appeals).    Nasal Procedures:  Nasal obstruction can interfere with nasal breathing during the day and night.  Studies have shown that relief of nasal obstruction can improve the ability of some patients to tolerate positive airway pressure therapy for obstructive sleep apnea1.  Treatment options include medications such as nasal saline, topical corticosteroid and antihistamine sprays, and oral medications such as antihistamines or decongestants. Non-surgical treatments can include external nasal dilators for selected patients. If these are not successful by themselves, surgery can improve the nasal airway either alone or in combination with these other options.  Combination Procedures:  Combination of surgical procedures and other treatments may be recommended, particularly if patients have more  than one area of narrowing or persistent positional disease.  The success rate of combination surgery ranges from 66-80%2,3.    References  1. Vikki ACRTER. The Role of the Nose in Snoring and Obstructive Sleep Apnoea: An Update.  Eur Arch Otorhinolaryngol. 2011; 268: 1365-73.  2.  Yandy SM; Brandi JA; Jason JR; Pallanch JF; Karen MB; Radha SG; Caleb BANDA. Surgical modifications of the upper airway for obstructive sleep apnea in adults: a systematic review and meta-analysis. SLEEP 2010;33(10):6156-1623. Oliva CARR. Hypopharyngeal surgery in obstructive sleep apnea: an evidence-based medicine review.  Arch Otolaryngol Head Neck Surg. 2006 Feb;132(2):206-13.  3. Seth YH1, Margaret Y, Eliezer KATHIA. The efficacy of anatomically based multilevel surgery for obstructive sleep apnea. Otolaryngol Head Neck Surg. 2003 Oct;129(4):327-35.  4. Oliva CARR, Goldberg A. Hypopharyngeal Surgery in Obstructive Sleep Apnea: An Evidence-Based Medicine Review. Arch Otolaryngol Head Neck Surg. 2006 Feb;132(2):206-13.  5. Mary PJ et al. Upper-Airway Stimulation for Obstructive Sleep Apnea.  N Engl J Med. 2014 Jan 9;370(2):139-49.  6. Charlee Y et al. Increased Incidence of Cardiovascular Disease in Middle-aged Men with Obstructive Sleep Apnea. Am J Respir Crit Care Med; 2002 166: 159-165  7. Red EM et al. Studying Life Effects and Effectiveness of Palatopharyngoplasty (SLEEP) study: Subjective Outcomes of Isolated Uvulopalatopharyngoplasty. Otolaryngol Head Neck Surg. 2011; 144: 623-631.    Counseling / Behavioral Health  [facility]  Visit [email or web address] or call [phone] to find a clinic close to you.  Or call [phone] for [facility].            Follow-ups after your visit        Follow-up notes from your care team     Return in about 2 weeks (around 9/12/2018) for Sleep Study Review.      Your next 10 appointments already scheduled     Oct 30, 2018  8:30 PM CDT   PSG Split with BED 4  SH SLEEP   [facility]    [address]  [place] [address] [phone]            Nov 14, 2018 10:00 AM CST   Return Sleep Patient with Bennett Ezra Goltz, PA-C   [facility]    [address]  [place] [address] [phone]              Future tests that were ordered for you today     Open Future Orders        Priority Expected Expires Ordered    Comprehensive Sleep Study Routine  2/25/2019 8/29/2018            Who to contact     If you have questions or need follow up information about today's clinic visit or your schedule please contact [facility] directly at [phone].  Normal or non-critical lab and imaging results will be communicated to you by Get[employer] InfoServiceshart, letter or phone within 4 business days after the clinic has received the results. If you do not hear from us within 7 days, please contact the clinic through [employer] or phone. If you have a critical or abnormal lab result, we will notify you by phone as soon as possible.  Submit refill requests through [email or web address] or call your pharmacy and they will forward the refill request to us. Please allow 3 business days for your refill to be completed.          Additional Information About Your Visit        [email or web address] Information     [email or web address] gives you secure access to your electronic health record. If you see a primary care provider, you can also send messages to your care team and make appointments. If you have questions, please call your primary care clinic.  If you do not have a primary care provider, please call [phone] and they will assist you.        Care EveryWhere ID     This is your Care EveryWhere ID. This could be used by other organizations to access your [facility] medical records  [id number]        Your Vitals Were     Pulse Temperature Respirations Height Pulse Oximetry BMI (Body Mass  "Index)    96 97.9  F (36.6  C) (Oral) 16 1.854 m (6' 1\") 97% 28.84 kg/m2       Blood Pressure from Last 3 Encounters:   08/29/18 135/87   12/02/15 133/79   11/02/15 134/74    Weight from Last 3 Encounters:   08/29/18 99.2 kg (218 lb 9.6 oz)   12/02/15 96.2 kg (212 lb)   11/02/15 93 kg (205 lb)               Primary Care Provider Office Phone # Fax #    Teodoro Sams [phone]       [facility] [address]  [facility] [address]        Equal Access to Services     MARTIN KEMP : Hadii shashank barrosoo Soagustin, waaxda luqadaha, qaybta kaalmada adeegyada, eb genao. So [facility] [phone].    ATENCIÓN: Si habla español, tiene a pacheco disposición servicios gratuitos de asistencia lingüística. [facility] [phone].    We comply with applicable federal civil rights laws and [place] laws. We do not discriminate on the basis of race, color, national origin, age, disability, sex, sexual orientation, or gender identity.            Thank you!     Thank you for choosing [place] [facility]  for your care. Our goal is always to provide you with excellent care. Hearing back from our patients is one way we can continue to improve our services. Please take a few minutes to complete the written survey that you may receive in the mail after your visit with us. Thank you!             Your Updated Medication List - Protect others around you: Learn how to safely use, store and throw away your medicines at www.disposemymeds.org.          This list is accurate as of 8/29/18  2:04 PM.  Always use your most recent med list.                   Brand Name Dispense Instructions for use Diagnosis    aspirin 81 MG EC tablet      Take 81 mg by mouth        finasteride 1 MG tablet    PROPECIA PRO-ANA    30 tablet    Take 1 tablet by mouth daily.    Alopecia, unspecified       PROBIOTIC DAILY PO           valerian root 530 MG Caps capsule             "

## 2018-10-30 ENCOUNTER — THERAPY VISIT (OUTPATIENT)
Dept: SLEEP MEDICINE | Facility: CLINIC | Age: 48
End: 2018-10-30
Payer: COMMERCIAL

## 2018-10-30 DIAGNOSIS — R06.83 SNORING: ICD-10-CM

## 2018-10-30 DIAGNOSIS — I63.9 ISCHEMIC STROKE (H): ICD-10-CM

## 2018-10-30 DIAGNOSIS — R53.83 FATIGUE, UNSPECIFIED TYPE: ICD-10-CM

## 2018-10-30 DIAGNOSIS — R03.0 BORDERLINE HYPERTENSION: ICD-10-CM

## 2018-10-30 PROCEDURE — 95810 POLYSOM 6/> YRS 4/> PARAM: CPT | Performed by: PSYCHIATRY & NEUROLOGY

## 2018-10-30 NOTE — MR AVS SNAPSHOT
After Visit Summary   10/30/2018    Kai Allen    MRN: [id number]           Patient Information     Date Of Birth          1970        Visit Information        Provider Department      10/30/2018 8:30 PM BED [facility]EP [facility]        Today's Diagnoses     Snoring        Ischemic stroke (H)        Borderline hypertension        Fatigue, unspecified type           Follow-ups after your visit        Your next 10 appointments already scheduled     Nov 14, 2018 10:00 AM CST   Return Sleep Patient with Bennett Ezra Goltz, PA-C   [facility] ([facility])    [address] [address] [phone]              Who to contact     If you have questions or need follow up information about today's clinic visit or your schedule please contact [facility] directly at [phone].  Normal or non-critical lab and imaging results will be communicated to you by MyChart, letter or phone within 4 business days after the clinic has received the results. If you do not hear from us within 7 days, please contact the clinic through "[employer]hart or phone. If you have a critical or abnormal lab result, we will notify you by phone as soon as possible.  Submit refill requests through Omni-ID or call your pharmacy and they will forward the refill request to us. Please allow 3 business days for your refill to be completed.          Additional Information About Your Visit        MyChart Information     Om[employer]-ID gives you secure access to your electronic health record. If you see a primary care provider, you can also send messages to your care team and make appointments. If you have questions, please call your primary care clinic.  If you do not have a primary care provider, please call [phone] and they will assist you.        Care EveryWhere ID     This is your Care EveryWhere ID. This could be used by other  organizations to access your [facility] medical records  [id number]         Blood Pressure from Last 3 Encounters:   08/29/18 135/87   12/02/15 133/79   11/02/15 134/74    Weight from Last 3 Encounters:   08/29/18 99.2 kg (218 lb 9.6 oz)   12/02/15 96.2 kg (212 lb)   11/02/15 93 kg (205 lb)              We Performed the Following     Comprehensive Sleep Study        Primary Care Provider Office Phone # Fax #    Teodoro Sams [phone]       [facility] [address]  [facility] [address]        Equal Access to Services     MARGARETH KEMP : Hadii aad ku hadasho Soomaali, waaxda luqadaha, qaybta kaalmada adeegyada, eb genao. So [facility] [phone].    ATENCIÓN: Si habla español, tiene a pacheco disposición servicios gratuitos de asistencia lingüística. [facility] [phone].    We comply with applicable federal civil rights laws and [place] laws. We do not discriminate on the basis of race, color, national origin, age, disability, sex, sexual orientation, or gender identity.            Thank you!     Thank you for choosing [place] [facility]  for your care. Our goal is always to provide you with excellent care. Hearing back from our patients is one way we can continue to improve our services. Please take a few minutes to complete the written survey that you may receive in the mail after your visit with us. Thank you!             Your Updated Medication List - Protect others around you: Learn how to safely use, store and throw away your medicines at www.disposemymeds.org.          This list is accurate as of 10/30/18 11:59 PM.  Always use your most recent med list.                   Brand Name Dispense Instructions for use Diagnosis    aspirin 81 MG EC tablet      Take 81 mg by mouth        finasteride 1 MG tablet    PROPECIA PRO-ANA    30 tablet    Take 1 tablet by mouth daily.    Alopecia, unspecified       PROBIOTIC DAILY PO           valerian root  530 MG Caps capsule

## 2018-11-02 LAB — SLPCOMP: NORMAL

## 2018-11-02 NOTE — PROCEDURES
" SLEEP STUDY INTERPRETATION  DIAGNOSTIC POLYSOMNOGRAPHY REPORT      Patient: ROULA SIMPSON  YOB: 1970  Study Date: 10/30/2018  MRN: [id number]  Referring Provider: No Referring MD  Ordering Provider: Goltz, PA-C, Bennett    Indications for Polysomnography: The patient is a 48 y old Male who is 6' 1\" and weighs 218.0 lbs. His BMI is 28.9, [place] sleepiness scale 0.0 and neck circumference is 39.0 cm. Relevant medical history includes snoring, witnessed apneas. A diagnostic polysomnogram was performed to evaluate for sleep apnea.    Polysomnogram Data: A full night polysomnogram recorded the standard physiologic parameters including EEG, EOG, EMG, ECG, nasal and oral airflow. Respiratory parameters of chest and abdominal movements were recorded with respiratory inductance plethysmography. Oxygen saturation was recorded by pulse oximetry. Hypopnea scoring rule used: 1B 4%.    Sleep Architecture: Sleep fragmentation  The total recording time of the polysomnogram was 496.0 minutes. The total sleep time was 388.5 minutes. Sleep latency was 15.0 minutes. REM latency was 105.0 minutes. Arousal index was 31.4 arousals per hour. Sleep efficiency was 78.3%. Wake after sleep onset was 77.0 minutes. The patient spent 13.4% of total sleep time in Stage N1, 56.6% in Stage N2, 4.2% in Stage N3, and 25.7% in REM. Time in REM supine was 39.0 minutes.    Respiration: Moderate NELLY with hypoxemia    Events ? The polysomnogram revealed a presence of 58 obstructive, - central, and - mixed apneas resulting in an apnea index of 9.0 events per hour. There were 39 obstructive hypopneas and - central hypopneas resulting in an obstructive hypopnea index of 6.0 and central hypopnea index of - events per hour. The combined apnea/hypopnea index was 15.0 events per hour (central apnea/hypopnea index was - events per hour). The REM AHI was 22.8 events per hour. The supine AHI was 26.1 events per hour. The RERA index was 6.2 events " per hour.  The RDI was 21.2 events per hour.    Snoring - was reported as mild-moderate.    Respiratory rate and pattern - was notable for normal respiratory rate and pattern.    Sustained Sleep Associated Hypoventilation - Transcutaneous carbon dioxide monitoring was not used.    Sleep Associated Hypoxemia - (Greater than 5 minutes O2 sat at or below 88%) was present. Baseline oxygen saturation was 93.2%. Lowest oxygen saturation was 83.5%. Time spent less than or equal to 88% was 8.0 minutes. Time spent less than or equal to 89% was 11.2 minutes.    Movement Activity: The patient had elevated periodic limb movements (PLMs) during the study. The majority of these were not associated with cortical arousal.    Periodic Limb Activity - There were 176 PLMs during the entire study. The PLM index was 27.2 movements per hour. The PLM Arousal Index was 4.8 per hour.    REM EMG Activity - Excessive muscle activity was not present.    Nocturnal Behavior - Abnormal sleep related behaviors were not noted.    Bruxism - None apparent.    Cardiac Summary: Limited one lead EKG with signal often obscured by artifact.  Regardless the patient appeared to have predominantly narrow QRS complexes preceded by P waves suggestive of sinus rhythm.  Occasional wide QRS complexes suggestive of PVC s.  The average pulse rate was 52.0 bpm. The minimum pulse rate was 42.9 bpm while the maximum pulse rate was 86.4 bpm.      Assessment:     Moderate NELLY with hypoxemia     The patient had elevated periodic limb movements (PLMs) during the study. The majority of these were not associated with cortical arousal.    Recommendations:    NELLY can be treated with the following options:  o Dental Appliance  o Auto CPAP  o Upper Airway Surgery  o Recommend following up hypoxemia with overnight oximetry once treatment is establish.     Advice regarding the risks of drowsy driving.    Suggest optimizing sleep schedule and avoiding sleep  deprivation.    Treatment of PLMs (dopaminergic agents or delta-2 ligands) should be targeted at patients who either have wakeful motor restlessness or those in whom there is a high clinical suspicion of periodic limb movement disorder and not for elevated PLMs alone.    Diagnostic Code(s): G47.33, G47.36, G47.9      Sony Samson MD 11-2-2018

## 2018-11-14 ENCOUNTER — OFFICE VISIT (OUTPATIENT)
Dept: SLEEP MEDICINE | Facility: CLINIC | Age: 48
End: 2018-11-14
Payer: COMMERCIAL

## 2018-11-14 VITALS
HEART RATE: 63 BPM | SYSTOLIC BLOOD PRESSURE: 126 MMHG | DIASTOLIC BLOOD PRESSURE: 75 MMHG | WEIGHT: 221.8 LBS | OXYGEN SATURATION: 96 % | HEIGHT: 73 IN | RESPIRATION RATE: 16 BRPM | BODY MASS INDEX: 29.4 KG/M2 | TEMPERATURE: 97.6 F

## 2018-11-14 DIAGNOSIS — G47.33 OSA (OBSTRUCTIVE SLEEP APNEA): Primary | ICD-10-CM

## 2018-11-14 PROCEDURE — 99214 OFFICE O/P EST MOD 30 MIN: CPT | Performed by: PHYSICIAN ASSISTANT

## 2018-11-14 NOTE — NURSING NOTE
"Chief Complaint   Patient presents with     Study Results     PSG f/u       Initial /75  Pulse 63  Temp 97.6  F (36.4  C) (Oral)  Resp 16  Ht 1.854 m (6' 1\")  Wt 100.6 kg (221 lb 12.8 oz)  SpO2 96%  BMI 29.26 kg/m2 Estimated body mass index is 29.26 kg/(m^2) as calculated from the following:    Height as of this encounter: 1.854 m (6' 1\").    Weight as of this encounter: 100.6 kg (221 lb 12.8 oz).    Medication Reconciliation: complete     ESS 3  Saray Arguello      "

## 2018-11-14 NOTE — PROGRESS NOTES
Sleep Study Follow-Up Visit:    Date on this visit: 11/14/2018    Kai Allen comes in today for follow-up of his sleep study done on 10/30/2018 at the [facility] for  snoring for 10 years or so and recently having a CVA. His medical history is otherwise non-contributory.    Sleep latency 15 minutes without Ambien.  REM achieved.   REM latency 105 minutes.  Sleep efficiency 78.3%. Total sleep time 388.5 minutes.    Sleep architecture:  Stage 1, 13.4% (5%), stage 2, 56.6% (45-55%), stage 3, 4.2% (15-20%), stage REM, 25.7% (20-25%).  AHI was 15/hr( no central apnea), with desaturations down to 83%. He spent 11.2 minutes below 90% SpO2, 8 minutes below 89%. RDI 21.2/hr.  REM RDI 26.4/hr, consistent with moderate REM NELLY.  Supine RDI 36.1/hr (AHI 26.1/hr), consistent with moderate to severe SUPINE NELLY. His non-supine RDI was 2.1/hr (AHI 1/hr). He spent 100 minutes in REM, 39 of those minutes supine. He only had 3 breathing events in REM in 61 minutes when lateral. Periodic Limb Movement Index 27.2/hour, 4.8/hr were associated with arousals.       CPAP titration:  CPAP was not initiated. These findings were reviewed with the patient.    Past medical/surgical history, family history, social history, medications and allergies were reviewed.      Problem List:  Patient Active Problem List    Diagnosis Date Noted     NELLY (obstructive sleep apnea) 11/14/2018     Priority: Medium     CARDIOVASCULAR SCREENING; LDL GOAL LESS THAN 160 10/31/2010     Priority: Medium     Routine general medical examination at a health care facility 02/25/2008     Priority: Medium     Umbilical hernia 10/06/2003     Priority: Medium     Problem list name updated by automated process. Provider to review          Impression/Plan:    (G47.33) NELLY (obstructive sleep apnea)  (primary encounter diagnosis)  Comment: This study showed moderate NELLY, AHI 15/hr. It was entirely positional. His lateral AHI was 1/hr, including 1 hour of  REM. His supine AHI was 26/hr.  Plan: SLEEP DENTAL REFERRAL        He will start with Naa and possibly consider a dental appliance.      He will follow up with me in about 4 month(s).     Twenty-five minutes spent with patient, all of which were spent face-to-face counseling, consulting, coordinating plan of care.      Bennett Goltz, PA-C    CC: Teodoro Sams MD

## 2018-11-14 NOTE — PATIENT INSTRUCTIONS

## 2018-11-14 NOTE — MR AVS SNAPSHOT
After Visit Summary   11/14/2018    Kai Allen    MRN: [id number]           Patient Information     Date Of Birth          1970        Visit Information        Provider Department      11/14/2018 10:00 AM Goltz, Bennett Ezra, PA-C [facility]        Today's Diagnoses     NELLY (obstructive sleep apnea)    -  1      Care Instructions      Your BMI is Body mass index is 29.26 kg/(m^2).  Weight management is a personal decision.  If you are interested in exploring weight loss strategies, the following discussion covers the approaches that may be successful. Body mass index (BMI) is one way to tell whether you are at a healthy weight, overweight, or obese. It measures your weight in relation to your height.  A BMI of 18.5 to 24.9 is in the healthy range. A person with a BMI of 25 to 29.9 is considered overweight, and someone with a BMI of 30 or greater is considered obese. More than two-thirds of American adults are considered overweight or obese.  Being overweight or obese increases the risk for further weight gain. Excess weight may lead to heart disease and diabetes.  Creating and following plans for healthy eating and physical activity may help you improve your health.  Weight control is part of healthy lifestyle and includes exercise, emotional health, and healthy eating habits. Careful eating habits lifelong are the mainstay of weight control. Though there are significant health benefits from weight loss, long-term weight loss with diet alone may be very difficult to achieve- studies show long-term success with dietary management in less than 10% of people. Attaining a healthy weight may be especially difficult to achieve in those with severe obesity. In some cases, medications, devices and surgical management might be considered.  What can you do?  If you are overweight or obese and are interested in methods for weight loss, you should discuss this with your provider.      Consider reducing daily calorie intake by 500 calories.     Keep a food journal.     Avoiding skipping meals, consider cutting portions instead.    Diet combined with exercise helps maintain muscle while optimizing fat loss. Strength training is particularly important for building and maintaining muscle mass. Exercise helps reduce stress, increase energy, and improves fitness. Increasing exercise without diet control, however, may not burn enough calories to loose weight.       Start walking three days a week 10-20 minutes at a time    Work towards walking thirty minutes five days a week     Eventually, increase the speed of your walking for 1-2 minutes at time    In addition, we recommend that you review healthy lifestyles and methods for weight loss available through the National Institutes of Health patient information sites:  http://win.niddk.nih.gov/publications/index.htm    And look into health and wellness programs that may be available through your health insurance provider, employer, local community center, or leo club.    Weight management plan: Patient was referred to their PCP to discuss a diet and exercise plan.            Follow-ups after your visit        Additional Services     SLEEP DENTAL REFERRAL       Dental appliance resources recommended by [facility]     Below is a list of dental appliance resources recommended by [facility]   If you wish to choose your own dental sleep dentist, you may identify a provider close to you: [email or web address]    [facility] Dental   Sleep Medicine [facility]   Mark García DDS, [place] [address]   Appointments: [phone]  Fax: [phone]   Email: [email or web address]   Dental and Oral Surgery Clinic   Bi Canales, PANKAJ Claire DDS   [address]  [facility], [address]   [place] [address]   Appointments: [phone]   Website: [email or web address]    Snoring and Sleep Apnea   Dental Treatment Center   DWAYNE Nichols DDS  [address], [address]   [place] [address]   Appointments: [phone]   Fax: [phone]   Email: [email or web address]  Website: [email or web address]     [facility]   Office 1   Varun An DDS - [DME Medicare]  [address], [address]   [place] [address]  Appointments: [phone]  Fax: [phone]  Website: [employer]    [facility]   Office 2  Varun An DDS - [DME Medicare]  [address]  [place] [address]  Appointments: [phone]  Fax: [phone]  Website: [employer]    [facility]  Alcides Rivera DDS  [address] [place] [address]  Appointments: [phone]    [facility]   [place] Office   Chris Claire DDS   [facility]   [address], [address]   [place] [address]   Appointments: [phone]   Fax: [phone]   Website: [facility]     [facility]   [place] Office   Martin Dexter DDS, MS - [DME Medicare]  [facility]   [address], [address]   [place] [address]   Appointments: [phone]   Fax: [phone]   Website: [facility]     Mukesh Ansari DMD, MSD - [DME Medicare]  [address]  [place] [address]  Appointments [phone]  Fax: [phone]  Website: [email or web address]    [facility]   [place] Office   Mackenzie Ramírez DDS, PhD, [facility]   [address] [place] [address], [address]   [place] [address]   Appointments: [phone]   Fax: [phone]   Website: [email or web address].[employer]     [facility]   [place] Office   Mackenzie Ramírez DDS, PhD,  MS [facility]   [address], [address]   [place] [address]   Appointments: [phone]   Fax: [phone]   Website: [facility]     [facility] [place] [facility]  Rach Bean DDS  [facility]  [address] [place] [address]  Appointments: [phone]   Fax: [phone]    If you wish to choose your own dental sleep dentist, you may identify a provider close to you: [email or web address]      AHI: 15/hr PSG DATE: 10/30/2018     Return to clinic in 3 months for Home Sleep Test to confirm adequacy of Treatment.    Other information regarding this referral: Mandibular repositioning appliance for NELLY. If your insurance asks for a CPT code, it is .    Please be aware that coverage of these services is subject to the terms and limitations of your health insurance plan.  Call member services at your health plan with any benefit or coverage questions.      Please bring the following to your appointment:    >>   List of current medications   >>   This referral request   >>   Any documents/labs given to you for this referral                  Your next 10 appointments already scheduled     Mar 15, 2019  8:30 AM CDT   Return Sleep Patient with Bennett Ezra Goltz, PA-C   [facility] ([facility])    [address] [address] [phone]              Who to contact     If you have questions or need follow up information about today's clinic visit or your schedule please contact [place] [facility] directly at [phone].  Normal or non-critical lab and imaging results will be communicated to you by MyChart, letter or phone within 4 business days after the clinic has received the results. If you do not hear from us within 7 days, please contact the clinic through MyChart or phone. If you have a critical or abnormal lab result, we will notify you  "by phone as soon as possible.  Submit refill requests through [employer] or call your pharmacy and they will forward the refill request to us. Please allow 3 business days for your refill to be completed.          Additional Information About Your Visit        [employer] Information     [employer] gives you secure access to your electronic health record. If you see a primary care provider, you can also send messages to your care team and make appointments. If you have questions, please call your primary care clinic.  If you do not have a primary care provider, please call [phone] and they will assist you.        Care EveryWhere ID     This is your Care EveryWhere ID. This could be used by other organizations to access your [facility] medical records  [id number]        Your Vitals Were     Pulse Temperature Respirations Height Pulse Oximetry BMI (Body Mass Index)    63 97.6  F (36.4  C) (Oral) 16 1.854 m (6' 1\") 96% 29.26 kg/m2       Blood Pressure from Last 3 Encounters:   11/14/18 126/75   08/29/18 135/87   12/02/15 133/79    Weight from Last 3 Encounters:   11/14/18 100.6 kg (221 lb 12.8 oz)   08/29/18 99.2 kg (218 lb 9.6 oz)   12/02/15 96.2 kg (212 lb)              We Performed the Following     SLEEP DENTAL REFERRAL        Primary Care Provider Office Phone # Fax #    Teodoro Sams [phone]       [facility] [address]  [facility] [address]        Equal Access to Services     [facility] AH: Hadii aad ku hadasho Soomaali, waaxda luqadaha, qaybta kaalmada adeegyada, wax"[employer] idiin haykari james . So [facility] [phone].    ATENCIÓN: Si habla español, tiene a pacheco disposición servicios gratuitos de asistencia lingüística. Llame al [phone].    We comply with applicable federal civil rights laws and [place] laws. We do not discriminate on the basis of race, color, national origin, age, disability, sex, sexual orientation, or gender identity.            Thank you!     Thank " you for choosing [place] [facility]  for your care. Our goal is always to provide you with excellent care. Hearing back from our patients is one way we can continue to improve our services. Please take a few minutes to complete the written survey that you may receive in the mail after your visit with us. Thank you!             Your Updated Medication List - Protect others around you: Learn how to safely use, store and throw away your medicines at www.disposemymeds.org.          This list is accurate as of 11/14/18 10:44 AM.  Always use your most recent med list.                   Brand Name Dispense Instructions for use Diagnosis    aspirin 81 MG EC tablet      Take 81 mg by mouth        finasteride 1 MG tablet    PROPECIA PRO-ANA    30 tablet    Take 1 tablet by mouth daily.    Alopecia, unspecified       PROBIOTIC DAILY PO           valerian root 530 MG Caps capsule

## 2018-11-14 NOTE — LETTER
11/14/2018        RE: Kai Allen  [address] [place] [address]        Sleep Study Follow-Up Visit:    Date on this visit: 11/14/2018    Kai Allen comes in today for follow-up of his sleep study done on 10/30/2018 at the [facility] for  snoring for 10 years or so and recently having a CVA. His medical history is otherwise non-contributory.    Sleep latency 15 minutes without Ambien.  REM achieved.   REM latency 105 minutes.  Sleep efficiency 78.3%. Total sleep time 388.5 minutes.    Sleep architecture:  Stage 1, 13.4% (5%), stage 2, 56.6% (45-55%), stage 3, 4.2% (15-20%), stage REM, 25.7% (20-25%).  AHI was 15/hr( no central apnea), with desaturations down to 83%. He spent 11.2 minutes below 90% SpO2, 8 minutes below 89%. RDI 21.2/hr.  REM RDI 26.4/hr, consistent with moderate REM NELLY.  Supine RDI 36.1/hr (AHI 26.1/hr), consistent with moderate to severe SUPINE NELLY. His non-supine RDI was 2.1/hr (AHI 1/hr). He spent 100 minutes in REM, 39 of those minutes supine. He only had 3 breathing events in REM in 61 minutes when lateral. Periodic Limb Movement Index 27.2/hour, 4.8/hr were associated with arousals.       CPAP titration:  CPAP was not initiated. These findings were reviewed with the patient.    Past medical/surgical history, family history, social history, medications and allergies were reviewed.      Problem List:  Patient Active Problem List    Diagnosis Date Noted     NELLY (obstructive sleep apnea) 11/14/2018     Priority: Medium     CARDIOVASCULAR SCREENING; LDL GOAL LESS THAN 160 10/31/2010     Priority: Medium     Routine general medical examination at a health care facility 02/25/2008     Priority: Medium     Umbilical hernia 10/06/2003     Priority: Medium     Problem list name updated by automated process. Provider to review          Impression/Plan:    (G47.33) NELLY (obstructive sleep apnea)  (primary encounter diagnosis)  Comment: This study showed moderate  NELLY, AHI 15/hr. It was entirely positional. His lateral AHI was 1/hr, including 1 hour of REM. His supine AHI was 26/hr.  Plan: SLEEP DENTAL REFERRAL        He will start with Naa and possibly consider a dental appliance.      He will follow up with me in about 4 month(s).     Twenty-five minutes spent with patient, all of which were spent face-to-face counseling, consulting, coordinating plan of care.      Bennett Goltz, PA-C    CC: Teodoro Sams MD            Sincerely,        Bennett Ezra Goltz, PA-C

## 2019-06-21 ENCOUNTER — TRANSFERRED RECORDS (OUTPATIENT)
Dept: HEALTH INFORMATION MANAGEMENT | Facility: CLINIC | Age: 49
End: 2019-06-21

## 2019-11-18 ENCOUNTER — TELEPHONE (OUTPATIENT)
Dept: SLEEP MEDICINE | Facility: CLINIC | Age: 49
End: 2019-11-18

## 2019-11-18 DIAGNOSIS — G47.33 OSA (OBSTRUCTIVE SLEEP APNEA): Primary | ICD-10-CM

## 2019-11-18 NOTE — TELEPHONE ENCOUNTER
An order was placed for a CPAP. I sent a MyChart note with contact information for [facility].  Bennett Goltz, PA-C

## 2019-11-18 NOTE — TELEPHONE ENCOUNTER
Reason for call:  Other   Patient called regarding (reason for call): prescription  Additional comments: Patient tried the slumberbump and dental appliance without success. Was advised by neurologist to go forward and get a cpap. Would like a prescription for cpap be sent over to [facility]. Please call patient once approved to set up fitting    Phone number to reach patient:  Home number on file [phone] (home)    Best Time:  Early mornings    Can we leave a detailed message on this number?  YES

## 2019-11-22 ENCOUNTER — TELEPHONE (OUTPATIENT)
Dept: SLEEP MEDICINE | Facility: CLINIC | Age: 49
End: 2019-11-22

## 2019-11-22 NOTE — TELEPHONE ENCOUNTER
Called patient to schedule CPAP setup appointment with [facility]. Left voicemail for patient to call [facility] back at [phone] to schedule.

## 2019-12-13 ENCOUNTER — DOCUMENTATION ONLY (OUTPATIENT)
Dept: SLEEP MEDICINE | Facility: CLINIC | Age: 49
End: 2019-12-13
Payer: COMMERCIAL

## 2019-12-13 NOTE — PROGRESS NOTES
Patient was offered choice of vendor and chose [facility].  Patient Kai Allen was set up at [facility] on December 13, 2019. Patient received a Resmed AirSense 10 Auto. Pressures were set at 5-15 cm H2O.   Patient s ramp is 5 cm H2O for Off and FLEX/EPR is EPR, 2.  Patient received a Resmed Mask name: AIRFIT [id number]  Nasal mask size Small, heated tubing and heated humidifier.  Patient is enrolled in the STM Program and does not need to meet compliance.    Sherrie Dale

## 2019-12-16 ENCOUNTER — DOCUMENTATION ONLY (OUTPATIENT)
Dept: SLEEP MEDICINE | Facility: CLINIC | Age: 49
End: 2019-12-16

## 2019-12-16 NOTE — PROGRESS NOTES
3 DAY STM VISIT    Diagnostic AHI: 15    PSG    Patient contacted for 3 day STM visit  Message left for patient to return call    Replacement device: No  STM ordered by provider: Yes     Device type: Auto-CPAP  PAP settings from order::  CPAP min 5 cm  H20       CPAP max 15 cm  H20        Mask type:    Nasal Mask     Device settings from machine      Min CPAP 5             Max CPAP 15            Assessment: one night of usage over four hours reporting   Action plan: Patient to have 14 day STM visit. Patient has a follow up visit scheduled:   not required by insurance.    Total time spent on accessing, reviewing and interpreting remote patient PAP therapy data:   10 minutes      Total time spent with direct patient communication :   1 minutes

## 2019-12-30 ENCOUNTER — DOCUMENTATION ONLY (OUTPATIENT)
Dept: SLEEP MEDICINE | Facility: CLINIC | Age: 49
End: 2019-12-30

## 2019-12-30 NOTE — PROGRESS NOTES
14  DAY STM VISIT    Diagnostic AHI: 15    PSG    Message left for patient to return call     Assessment: Pt meeting objective benchmarks.       Action plan: waiting for patient to return call.  and pt to have 30 day STM visit.      Device type: Auto-CPAP    PAP settings: CPAP min 5.0 cm  H20       CPAP max 15.0 cm  H20             95th% pressure 7.3 cm  H20     Mask type:  Nasal Mask    Objective measures: 14 day rolling measures      Compliance  78 %      Leak  0.69 lpm  last  upload      AHI 0.54   last  upload      Average number of minutes 324      Objective measure goal  Compliance   Goal >70%  Leak   Goal < 24 lpm  AHI  Goal < 5  Usage  Goal >240      Total time spent on accessing, reviewing and interpreting remote patient PAP therapy data:   10 minutes      Total time spent with direct patient communication :   1 minutes

## 2020-01-02 NOTE — PROGRESS NOTES
Patient returned call.    Subjective measures:   Pt has been struggling a bit with nasal congestion.   He also feels that there is increase work upon exhaling against pressure.       Assessment: Pt meeting objective benchmarks.  Patient failing following subjective benchmarks: congestion    Action plan:pt to have 30 day [facility] visit. Pt to start using a nasal saline spray and will increase humidity.  EPR was increased to 3 remotely.     Total time spent on remote patient monitoring data analysis and patient contact today:   7 minutes

## 2020-01-13 ENCOUNTER — DOCUMENTATION ONLY (OUTPATIENT)
Dept: SLEEP MEDICINE | Facility: CLINIC | Age: 50
End: 2020-01-13

## 2020-01-13 NOTE — PROGRESS NOTES
30 DAY [facility] VISIT    Diagnostic AHI: 15   PSG    Message left for patient to return call     Assessment: Pt meeting objective benchmarks.     Action plan: waiting for patient to return call.   Patient has not scheduled a follow up visit with Bennett Goltz, PA-C  Not required by insurance.   Device type: Auto-CPAP  PAP settings: CPAP min 5.0 cm  H20     CPAP max 15.0 cm  H20      95th% pressure 7 cm  H20   Mask type:  Nasal Mask  Objective measures: 14 day rolling measures      Compliance  78 %      Leak  0.51 lpm  last  upload      AHI 0.35   last  upload      Average number of minutes 294      Objective measure goal  Compliance   Goal >70%  Leak   Goal < 24 lpm  AHI  Goal < 5  Usage  Goal >240      Total time spent on accessing, reviewing and interpreting remote patient PAP therapy data:   10 minutes      Total time spent with direct patient communication :   1 minutes    Total time spent on remote patient monitoring analysis for last 30 days:   35  min

## 2020-02-16 ENCOUNTER — HEALTH MAINTENANCE LETTER (OUTPATIENT)
Age: 50
End: 2020-02-16

## 2020-07-18 ENCOUNTER — APPOINTMENT (OUTPATIENT)
Dept: CT IMAGING | Facility: CLINIC | Age: 50
End: 2020-07-18
Attending: EMERGENCY MEDICINE
Payer: COMMERCIAL

## 2020-07-18 ENCOUNTER — HOSPITAL ENCOUNTER (EMERGENCY)
Facility: CLINIC | Age: 50
Discharge: HOME | End: 2020-07-18
Attending: EMERGENCY MEDICINE | Admitting: EMERGENCY MEDICINE
Payer: COMMERCIAL

## 2020-07-18 VITALS
TEMPERATURE: 98.4 F | HEART RATE: 53 BPM | WEIGHT: 205 LBS | OXYGEN SATURATION: 98 % | DIASTOLIC BLOOD PRESSURE: 76 MMHG | RESPIRATION RATE: 16 BRPM | SYSTOLIC BLOOD PRESSURE: 151 MMHG | HEIGHT: 73 IN | BODY MASS INDEX: 27.17 KG/M2

## 2020-07-18 DIAGNOSIS — S13.9XXA NECK SPRAIN, INITIAL ENCOUNTER: ICD-10-CM

## 2020-07-18 DIAGNOSIS — S00.03XA HEMATOMA OF FRONTAL SCALP, INITIAL ENCOUNTER: ICD-10-CM
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
## 2020-07-18 DIAGNOSIS — T07.XXXA ABRASIONS OF MULTIPLE SITES: ICD-10-CM

## 2020-07-18 PROCEDURE — 25000128 H RX IP 250 OP 636: Performed by: EMERGENCY MEDICINE

## 2020-07-18 PROCEDURE — 90471 IMMUNIZATION ADMIN: CPT

## 2020-07-18 PROCEDURE — 99284 EMERGENCY DEPT VISIT MOD MDM: CPT | Mod: 25

## 2020-07-18 PROCEDURE — 90715 TDAP VACCINE 7 YRS/> IM: CPT | Performed by: EMERGENCY MEDICINE

## 2020-07-18 PROCEDURE — 72125 CT NECK SPINE W/O DYE: CPT

## 2020-07-18 PROCEDURE — 70450 CT HEAD/BRAIN W/O DYE: CPT

## 2020-07-18 RX ORDER — ACETAMINOPHEN 325 MG/1
650 TABLET ORAL ONCE
Status: DISCONTINUED | OUTPATIENT
Start: 2020-07-18 | End: 2020-07-18 | Stop reason: HOSPADM

## 2020-07-18 RX ORDER — RAMIPRIL 10 MG/1
CAPSULE ORAL DAILY
COMMUNITY

## 2020-07-18 RX ADMIN — CLOSTRIDIUM TETANI TOXOID ANTIGEN (FORMALDEHYDE INACTIVATED), CORYNEBACTERIUM DIPHTHERIAE TOXOID ANTIGEN (FORMALDEHYDE INACTIVATED), BORDETELLA PERTUSSIS TOXOID ANTIGEN (GLUTARALDEHYDE INACTIVATED), BORDETELLA PERTUSSIS FILAMENTOUS HEMAGGLUTININ ANTIGEN (FORMALDEHYDE INACTIVATED), BORDETELLA PERTUSSIS PERTACTIN ANTIGEN, AND BORDETELLA PERTUSSIS FIMBRIAE 2/3 ANTIGEN 0.5 ML: 5; 2; 2.5; 5; 3; 5 INJECTION, SUSPENSION INTRAMUSCULAR at 17:55

## 2020-07-18 ASSESSMENT — ENCOUNTER SYMPTOMS
MYALGIAS: 0
NAUSEA: 1
WOUND: 1
HEADACHES: 1
VOMITING: 0
NECK PAIN: 1
NUMBNESS: 0

## 2020-07-18 ASSESSMENT — MIFFLIN-ST. JEOR: SCORE: 1833.75

## 2020-07-18 NOTE — ED TRIAGE NOTES
Pt was outside trimming trees and tripped over a flower pot, falling backwards onto brick steps. Pt hit his head on the wall next to the steps. Pt having neck pain and pain where he hit his head. No loss of consciousness, A&Ox4. C-collar in place by EMS. Pt slightly nauseous for EMS but declined zofran, nausea is improving. Pt slightly dizzy when standing to sit on the stretcher.

## 2020-07-18 NOTE — ED AVS SNAPSHOT
Emergency Department  [address] [place] [address]  Phone:  [phone]  Fax:  [phone]                                    Kai Allen   MRN: [id number]    Department:   Emergency Department   Date of Visit:  7/18/2020           After Visit Summary Signature Page    I have received my discharge instructions, and my questions have been answered. I have discussed any challenges I see with this plan with the nurse or doctor.    ..........................................................................................................................................  Patient/Patient Representative Signature      ..........................................................................................................................................  Patient Representative Print Name and Relationship to Patient    ..................................................               ................................................  Date                                   Time    ..........................................................................................................................................  Reviewed by Signature/Title    ...................................................              ..............................................  Date                                               Time          22EPIC Rev 08/18

## 2020-07-18 NOTE — ED PROVIDER NOTES
"  History     Chief Complaint:  Fall    The history is provided by the patient and the EMS personnel.      Kai Allen is a 50 year old male with a history of hypertension who presents via EMS for evaluation after a fall. Kai was working outside when he backed up and tripped over a flower pot. This caused him to fall and his found himself \"prone\" after hitting his head on stone steps and the adjacent stone wall. He did not have loss of consciousness. He had slight nausea on the way to the emergency department but declined Zofran and is no longer nauseous. He denies blurred or double vision, numbness, or tingling. He has headache primarily where he hit his left forehead as well as 3/10 non-radiating neck pain. Although he sustained abrasions to his right elbow it is not painful. His last tetanus booster was in 2012.    Allergies:  No Known Allergies    Medications:    Aspirin 81 mg  Ramipril   Finasteride   Probiotic Product  Valerian root    Past Medical History:    Alopecia, unspecified  Hyperlipidemia   Hypertension   Obstructive sleep apnea   Sebaceous cyst  Stroke, cerebellar  Umbilical hernia without mention of obstruction or gangrene     Past Surgical History:    Lymph node biopsy  Herniorrhaphy ventral    Family History:    Arthritis  Cancer, breast  Cancer, lung   Coronary artery disease  Diabetes  Heart disease, CABG    Social History:  Marital Status:     PCP: Teodoro Sams MD  Negative for tobacco use.  Negative for smokeless tobacco use.  Negative for alcohol use. Quit 2018 post stroke  Negative for drug use.   Presents alone via EMS.    Review of Systems   Eyes: Negative for visual disturbance.   Gastrointestinal: Positive for nausea (resolved). Negative for vomiting.   Musculoskeletal: Positive for neck pain. Negative for arthralgias and myalgias.   Skin: Positive for wound.   Neurological: Positive for headaches. Negative for syncope and numbness.   All other systems reviewed and are " "negative.    Physical Exam     Patient Vitals for the past 24 hrs:   BP Temp Temp src Pulse Resp SpO2 Height Weight   07/18/20 1915 -- -- -- -- -- 98 % -- --   07/18/20 1900 -- -- -- -- -- 97 % -- --   07/18/20 1845 -- -- -- -- -- 97 % -- --   07/18/20 1830 (!) 151/76 -- -- 53 -- 98 % -- --   07/18/20 1700 -- -- -- -- -- 95 % -- --   07/18/20 1626 (!) 185/106 98.4  F (36.9  C) Oral 76 16 95 % 1.854 m (6' 1\") 93 kg (205 lb)       Physical Exam  General: Well-developed and well-nourished. Well appearing middle aged  man. Cooperative.  Head:  Left frontal hematoma with overlying abrasion.  Linear abrasion on the more superior left frontal scalp.  Eyes:  Conjunctivae, lids, and sclerae are normal.  PERRL.  ENT:  No hemotympanum.  No raccoon's eyes or alves sign.  Neck:  No midline tenderness.  Cervical collar in place.  CV:  Regular rate and rhythm. Normal heart sounds with no murmurs, rubs, or gallops detected.  Resp:  No respiratory distress. Clear to auscultation bilaterally without decreased breath sounds, wheezing, rales, or rhonchi.  GI:  Soft. Non-distended. Non-tender.    MS:  Normal ROM.  No focal bony tenderness to palpation in the extremities with unrestricted range of motion of joints.  No tenderness to palpation of the midline thoracic or lumbar spine.  No bilateral lower extremity edema.  Skin:  Warm. Non-diaphoretic. No pallor.  Abrasions to the face and scalp as well as the right elbow.  Neuro: Awake. A&Ox3. Normal strength including 5/5 strength with hand grasp bilaterally.  Sensation intact to light touch throughout the upper extremities.  Psych:  Normal mood and affect. Normal speech.  Vitals reviewed.    Emergency Department Course     Imaging:  Radiology findings were communicated with the patient who voiced understanding of the findings.    CT Head w/o IV contrast:   Normal for age, as per radiology.    CT Cervical Spine w/o IV contrast:   1.  No CT evidence for acute fracture or post " traumatic subluxation.   2.  Moderate right C5-6 neural foraminal stenosis due to chronic   degenerative change, as per radiology.     Interventions:  1755 Tdap (tetanus-diphtheria-acell pertussis), 0.5 ml, IM     Emergency Department Course:  Past medical records, nursing notes, and vitals reviewed.    1650 I performed an exam of the patient as documented above.     The patient was sent for head and cervical spine CT's while in the emergency department, results above.     1903 I rechecked the patient and discussed the results of his workup thus far. He is comfortable with discharge.    Findings and plan explained to the patient. Patient discharged home with instructions regarding supportive care, medications, and reasons to return. The importance of close follow-up was reviewed.    I personally reviewed the imaging results with the patient and answered all related questions prior to discharge.     Impression & Plan     Medical Decision Making:  Kai is a 50-year-old man who tripped on a flowerpot and fell striking his head against stone steps/wall.  He did not have loss of consciousness and initial nausea has resolved.  He has headache and neck pain with cervical collar applied by paramedics.  However, he denies all other concerns or injuries despite abrasions noted on exam over his right elbow.  Fortunately, he appears well on exam.  Cervical collar did remain on although he had no midline cervical, thoracic, or lumbar spinous tenderness.  Aside from abrasions on his extremities he had no signs of trauma here.  He does have a frontal hematoma and abrasion on his scalp though there is no evidence of basilar skull fracture.  He is not confused and is appropriate, moving all extremities appropriately.  There is no paresthesias or weakness of his upper extremities.  Fortunately, CT of the head reveals no intracranial hemorrhage or skull fracture.  CT of the cervical spine is similarly without acute pathology with  chronic degenerative changes noted.  His cervical collar was removed and he did feel overall improved with Tylenol for his mild pain.  His wounds were cleaned and dressed and his tetanus was updated.  At this point he is appropriate for discharge as there is no evidence of other traumatic injury requiring further work-up or treatment.  I have recommended he use Tylenol or ibuprofen for pain and try to rest over the next day given his head injury.  He understands he will likely be very stiff and sore tomorrow and so he should consider icing his affected areas.  We discussed wound care and return precautions for infection as well as low threshold for return with any worsening neck pain or upper extremity radiculopathy.  Otherwise he will follow-up with his primary care as needed.  All of his questions were answered and he verbalized understanding.  Amenable to discharge.    Diagnosis:    ICD-10-CM    1. Fall on same level from slipping, tripping and stumbling with subsequent striking against unspecified object, initial encounter  W01.10XA    2. Hematoma of frontal scalp, initial encounter  S00.03XA    3. Abrasions of multiple sites  T07.XXXA    4. Neck sprain, initial encounter  S13.9XXA        Disposition:  Discharged home.    Scribe Disclosure:  TIMBO, Maisha Lui, am serving as a scribe at 4:50 PM on 7/18/2020 to document services personally performed by Maddi Jim MD based on my observations and the provider's statements to me.      EMERGENCY DEPARTMENT       Maddi Jim MD  07/21/20 7028

## 2020-07-19 NOTE — DISCHARGE INSTRUCTIONS
Tylenol or ibuprofen as needed for pain.  Try to rest over the next day.  You will likely be very stiff and sore tomorrow.  Consider icing affected areas.  Keep wounds clean and dry.  Check at least once daily to make sure they do not look infected.  Return if you have severe headache, vision changes, uncontrolled vomiting, pain radiating down your arms, numbness or tingling in your arms, fever, or any other new or concerning symptoms.  Otherwise, follow-up with primary care as needed.

## 2020-07-21 ASSESSMENT — ENCOUNTER SYMPTOMS: ARTHRALGIAS: 0

## 2020-11-22 ENCOUNTER — HEALTH MAINTENANCE LETTER (OUTPATIENT)
Age: 50
End: 2020-11-22

## 2021-04-04 ENCOUNTER — HEALTH MAINTENANCE LETTER (OUTPATIENT)
Age: 51
End: 2021-04-04

## 2021-09-19 ENCOUNTER — HEALTH MAINTENANCE LETTER (OUTPATIENT)
Age: 51
End: 2021-09-19

## 2021-09-20 ENCOUNTER — VIRTUAL VISIT (OUTPATIENT)
Dept: SLEEP MEDICINE | Facility: CLINIC | Age: 51
End: 2021-09-20
Payer: COMMERCIAL

## 2021-09-20 DIAGNOSIS — G47.33 OSA (OBSTRUCTIVE SLEEP APNEA): Primary | ICD-10-CM

## 2021-09-20 PROCEDURE — 99214 OFFICE O/P EST MOD 30 MIN: CPT | Mod: 95 | Performed by: PHYSICIAN ASSISTANT

## 2021-09-20 NOTE — PROGRESS NOTES
Kai is a 51 year old who is being evaluated via a billable video visit.      How would you like to obtain your AVS? MyChart  If the video visit is dropped, the invitation should be resent by: Text to cell phone: [phone]  Will anyone else be joining your video visit? No      Video Start Time: 9:33 AM  Video-Visit Details    Type of service:  Video Visit    Video End Time:10:00 AM    Originating Location (pt. Location): Home    Distant Location (provider location):  [facility]     Platform used for Video Visit: Keystok      CPAP Follow-Up Visit:    Date on this visit: 9/20/2021    Kai Allen has a follow-up visit today to review his CPAP use for NELLY. He was initially seen for snoring for 10 years or so and recently having a CVA. His medical history is otherwise non-contributory.     Previous Study Results:   Date: 10/30/2018.  Weight 221 pounds.  AHI: 15/hr. RDI 21.2/hr. Non-supine RDI was 2.1/hr. O2 arnold 83%. 8 minutes below 89%    He needs a new prescription for supplies. His mask is a little loose. He feels the machine is working fine.     PAP machine: ResMed. Pressure settings: 5-15 cm    The compliance data shows that the patient used the CPAP for 78% of nights for >4 hours.  The 95th% pressure is 6.9 cm.  The 95th% leak is 2.6 lpm.  The average nightly usage is 269 min (306 min in the last 14 days).  The average AHI is 0.2/hr.       Interface:  Mask: N30i mask  Chin strap: No  Leak: Yes-occasionally- the cushion in the latest mask seemed a little crooked on one nostril.  Using Humidifier: Yes, does not run out  Condensation in hose or mask: Yes/No     Difficulties with therapy:    [-] Snoring with CPAP:  [-] Difficulty tolerating the pressure: feels the pressure is less than it used to be  [+] Epistaxis/dry nose: a little   [+] Nasal congestion: a little in the morning. He uses a nasal saline spray at bedtime.  [-] Dry mouth:  [-] Mouth breathing:   [-] Pain/skin  breakdown:      Improvements noted with CPAP:   [+] waking up more refreshed  [+] sleeping better with less arousals  [+] nocturia improved   [+] improved energy level during the day    Weight change since sleep study: maybe up 5-10 pounds.    Bedtime: 10-11:30 PM.  Wake time: 6:30-7 AM. Falls asleep in 10-15 minutes. Wakes 1-2 times per night for 5-10 minutes. Reason for waking: mask leak  Naps: no.       Past medical/surgical history, family history, social history, medications and allergies were reviewed.      Problem List:  Patient Active Problem List    Diagnosis Date Noted     NELLY (obstructive sleep apnea) 11/14/2018     Priority: Medium     CARDIOVASCULAR SCREENING; LDL GOAL LESS THAN 160 10/31/2010     Priority: Medium     Routine general medical examination at a health care facility 02/25/2008     Priority: Medium     Umbilical hernia 10/06/2003     Priority: Medium     Problem list name updated by automated process. Provider to review          Impression/Plan:    (G47.33) NELLY (obstructive sleep apnea)  (primary encounter diagnosis)  Comment: Kai is doing well with CPAP, just needs new supplies. His usage is a little low just this month. He has been falling asleep before putting the mask on. His wife is bothered by that and he intends to be more diligent about his use.  Plan: Comprehensive DME        Continue auto CPAP 5-15 cm. A prescription was written for new supplies. We reviewed recommendations for cleaning and replacing supplies. We reviewed usage goals.       He will follow up with me in about 2 year(s).     30 minutes were spent on the date of the encounter doing chart review, history and exam, documentation and further activities as noted above.     Bennett Goltz, PA-C    CC: No ref. provider found

## 2021-11-22 DIAGNOSIS — G47.30 SLEEP APNEA: Primary | ICD-10-CM

## 2021-11-24 ENCOUNTER — TELEPHONE (OUTPATIENT)
Dept: NEUROSURGERY | Facility: CLINIC | Age: 51
End: 2021-11-24
Payer: COMMERCIAL

## 2021-11-24 NOTE — TELEPHONE ENCOUNTER
Reason for call: Pt's wife called to request appointment for stroke team follow up visit. Please call [phone]. Thank you

## 2021-11-24 NOTE — TELEPHONE ENCOUNTER
Per notes from  neurology in 2018, pt was following up with them as well as Dr. Erwin with [facility]. Pt has never been seen by our team so could either be a new consult for one of the attendings or general neurology.    Please inform pt that he can contact  neurology or Dr. Erwin through [facility] if he would like to see them again or please schedule the pt with Dr. Ambrosio, Dr. Akers, or Dr. Sam whoever has next available.    Jennifer Garces BS, RN, SCRN  RN Stroke Neurology Care Coordinator  [facility] Service Line

## 2021-11-24 NOTE — TELEPHONE ENCOUNTER
I reached out to patient's wife No and found that they want to establish new care at [place] for follow stroke care. Their provider at [facility] is no longer someone they want to see. I could not find any openings with any of our providers here in [place] so I have referred her to the Neurology Specialty Scheduling team to see if they can assist with getting him in sooner. She was given their number and I advised her to call me if she has any further questions or concerns.

## 2022-05-01 ENCOUNTER — HEALTH MAINTENANCE LETTER (OUTPATIENT)
Age: 52
End: 2022-05-01

## 2022-11-20 ENCOUNTER — HEALTH MAINTENANCE LETTER (OUTPATIENT)
Age: 52
End: 2022-11-20

## 2023-06-02 ENCOUNTER — HEALTH MAINTENANCE LETTER (OUTPATIENT)
Age: 53
End: 2023-06-02

## 2023-12-04 ENCOUNTER — APPOINTMENT (OUTPATIENT)
Dept: MRI IMAGING | Facility: CLINIC | Age: 53
End: 2023-12-04
Attending: EMERGENCY MEDICINE
Payer: COMMERCIAL

## 2023-12-04 ENCOUNTER — HOSPITAL ENCOUNTER (EMERGENCY)
Facility: CLINIC | Age: 53
Discharge: HOME | End: 2023-12-05
Attending: EMERGENCY MEDICINE | Admitting: EMERGENCY MEDICINE
Payer: COMMERCIAL

## 2023-12-04 DIAGNOSIS — R42 LIGHTHEADEDNESS: ICD-10-CM

## 2023-12-04 DIAGNOSIS — Z86.73 HISTORY OF CVA (CEREBROVASCULAR ACCIDENT): ICD-10-CM

## 2023-12-04 DIAGNOSIS — R13.10 DYSPHAGIA, UNSPECIFIED TYPE: ICD-10-CM

## 2023-12-04 LAB
ALBUMIN SERPL BCG-MCNC: 4.4 G/DL (ref 3.5–5.2)
ALP SERPL-CCNC: 81 U/L (ref 40–150)
ALT SERPL W P-5'-P-CCNC: 28 U/L (ref 0–70)
ANION GAP SERPL CALCULATED.3IONS-SCNC: 9 MMOL/L (ref 7–15)
AST SERPL W P-5'-P-CCNC: 21 U/L (ref 0–45)
BASOPHILS # BLD AUTO: 0.1 10E3/UL (ref 0–0.2)
BASOPHILS NFR BLD AUTO: 1 %
BILIRUB SERPL-MCNC: 0.3 MG/DL
BUN SERPL-MCNC: 15.5 MG/DL (ref 6–20)
CALCIUM SERPL-MCNC: 9 MG/DL (ref 8.6–10)
CHLORIDE SERPL-SCNC: 103 MMOL/L (ref 98–107)
CREAT SERPL-MCNC: 0.88 MG/DL (ref 0.67–1.17)
DEPRECATED HCO3 PLAS-SCNC: 25 MMOL/L (ref 22–29)
EGFRCR SERPLBLD CKD-EPI 2021: >90 ML/MIN/1.73M2
EOSINOPHIL # BLD AUTO: 0.4 10E3/UL (ref 0–0.7)
EOSINOPHIL NFR BLD AUTO: 5 %
ERYTHROCYTE [DISTWIDTH] IN BLOOD BY AUTOMATED COUNT: 12.5 % (ref 10–15)
GLUCOSE SERPL-MCNC: 106 MG/DL (ref 70–99)
HCT VFR BLD AUTO: 44.7 % (ref 40–53)
HGB BLD-MCNC: 15 G/DL (ref 13.3–17.7)
HOLD SPECIMEN: NORMAL
IMM GRANULOCYTES # BLD: 0 10E3/UL
IMM GRANULOCYTES NFR BLD: 0 %
LYMPHOCYTES # BLD AUTO: 2.2 10E3/UL (ref 0.8–5.3)
LYMPHOCYTES NFR BLD AUTO: 29 %
MCH RBC QN AUTO: 29.4 PG (ref 26.5–33)
MCHC RBC AUTO-ENTMCNC: 33.6 G/DL (ref 31.5–36.5)
MCV RBC AUTO: 88 FL (ref 78–100)
MONOCYTES # BLD AUTO: 0.9 10E3/UL (ref 0–1.3)
MONOCYTES NFR BLD AUTO: 12 %
NEUTROPHILS # BLD AUTO: 4 10E3/UL (ref 1.6–8.3)
NEUTROPHILS NFR BLD AUTO: 53 %
NRBC # BLD AUTO: 0 10E3/UL
NRBC BLD AUTO-RTO: 0 /100
PLATELET # BLD AUTO: 225 10E3/UL (ref 150–450)
POTASSIUM SERPL-SCNC: 4.1 MMOL/L (ref 3.4–5.3)
PROT SERPL-MCNC: 7.2 G/DL (ref 6.4–8.3)
RBC # BLD AUTO: 5.11 10E6/UL (ref 4.4–5.9)
SODIUM SERPL-SCNC: 137 MMOL/L (ref 135–145)
TROPONIN T SERPL HS-MCNC: <6 NG/L
WBC # BLD AUTO: 7.5 10E3/UL (ref 4–11)

## 2023-12-04 PROCEDURE — 96361 HYDRATE IV INFUSION ADD-ON: CPT

## 2023-12-04 PROCEDURE — 70551 MRI BRAIN STEM W/O DYE: CPT

## 2023-12-04 PROCEDURE — 96360 HYDRATION IV INFUSION INIT: CPT

## 2023-12-04 PROCEDURE — 36415 COLL VENOUS BLD VENIPUNCTURE: CPT | Performed by: EMERGENCY MEDICINE

## 2023-12-04 PROCEDURE — 99285 EMERGENCY DEPT VISIT HI MDM: CPT | Mod: 25

## 2023-12-04 PROCEDURE — 80053 COMPREHEN METABOLIC PANEL: CPT | Performed by: EMERGENCY MEDICINE

## 2023-12-04 PROCEDURE — 84484 ASSAY OF TROPONIN QUANT: CPT | Performed by: EMERGENCY MEDICINE

## 2023-12-04 PROCEDURE — 93005 ELECTROCARDIOGRAM TRACING: CPT

## 2023-12-04 PROCEDURE — 85025 COMPLETE CBC W/AUTO DIFF WBC: CPT | Performed by: EMERGENCY MEDICINE

## 2023-12-04 PROCEDURE — 258N000003 HC RX IP 258 OP 636: Performed by: EMERGENCY MEDICINE

## 2023-12-04 RX ADMIN — SODIUM CHLORIDE 1000 ML: 9 INJECTION, SOLUTION INTRAVENOUS at 22:19

## 2023-12-04 ASSESSMENT — ACTIVITIES OF DAILY LIVING (ADL): ADLS_ACUITY_SCORE: 35

## 2023-12-05 VITALS
WEIGHT: 220 LBS | DIASTOLIC BLOOD PRESSURE: 81 MMHG | BODY MASS INDEX: 29.16 KG/M2 | HEIGHT: 73 IN | RESPIRATION RATE: 20 BRPM | HEART RATE: 63 BPM | SYSTOLIC BLOOD PRESSURE: 147 MMHG | TEMPERATURE: 96.9 F | OXYGEN SATURATION: 98 %

## 2023-12-05 LAB
ATRIAL RATE - MUSE: 53 BPM
ATRIAL RATE - MUSE: 54 BPM
DIASTOLIC BLOOD PRESSURE - MUSE: NORMAL MMHG
DIASTOLIC BLOOD PRESSURE - MUSE: NORMAL MMHG
INTERPRETATION ECG - MUSE: NORMAL
INTERPRETATION ECG - MUSE: NORMAL
P AXIS - MUSE: 60 DEGREES
P AXIS - MUSE: 65 DEGREES
PR INTERVAL - MUSE: 196 MS
PR INTERVAL - MUSE: 198 MS
QRS DURATION - MUSE: 94 MS
QRS DURATION - MUSE: 98 MS
QT - MUSE: 452 MS
QT - MUSE: 472 MS
QTC - MUSE: 424 MS
QTC - MUSE: 447 MS
R AXIS - MUSE: 49 DEGREES
R AXIS - MUSE: 56 DEGREES
SYSTOLIC BLOOD PRESSURE - MUSE: NORMAL MMHG
SYSTOLIC BLOOD PRESSURE - MUSE: NORMAL MMHG
T AXIS - MUSE: 16 DEGREES
T AXIS - MUSE: 31 DEGREES
VENTRICULAR RATE- MUSE: 53 BPM
VENTRICULAR RATE- MUSE: 54 BPM

## 2023-12-05 ASSESSMENT — ACTIVITIES OF DAILY LIVING (ADL): ADLS_ACUITY_SCORE: 35

## 2023-12-05 NOTE — DISCHARGE INSTRUCTIONS
As discussed, the exact cause of your difficulty swallowing and tongue tingling and lightheadedness is not entirely clear, though your workup here does not show any life-threatening causes.  I recommend you follow-up closely with your neurologist for further workup.  Do not hesitate to return to the ER if you have worsening of your symptoms or any other emergent concerns.

## 2023-12-05 NOTE — ED TRIAGE NOTES
"Patient presents with difficulty swallowing and tingly tongue, symptoms started 20 mins ago. Denies vision changes. Hx stroke. States he feels \"weird.\" Ate dinner at 1830.        "

## 2023-12-05 NOTE — ED PROVIDER NOTES
"  History     Chief Complaint:  Numbness       The history is provided by the patient.      Kai Allen is a 53 year old male with history of stroke who presents with numbness. About 45 minutes ago, he started having trouble swallowing and his entire tongue was tingling. He was also cold and shaky. He felt a light-lightheaded but not anymore. He is feeling better now with about 60% of his symptoms resolved. Denies speech issues, vision changes, headache, off balance, weakness, numbness, or abdominal pain.    Independent Historian:   None - Patient Only    Review of External Notes:   Yes-I reviewed his stroke workup from 2020 where he was seen by neurology for a cerebellar infarct.      Medications:    ASA 81  Altace  Propecia    Past Medical History:    Alopecia  Sebaceous cyst  NELLY  Umbilical hernia  Stroke    Past Surgical History:    Herniorrhaphy ventral         Physical Exam   Patient Vitals for the past 24 hrs:   BP Temp Temp src Pulse Resp SpO2 Height Weight   12/05/23 0123 -- -- -- 63 20 98 % -- --   12/05/23 0053 -- -- -- -- 20 98 % -- --   12/05/23 0026 -- -- -- 53 20 98 % -- --   12/05/23 0023 -- -- -- 53 -- 97 % -- --   12/04/23 2356 -- -- -- -- 11 98 % -- --   12/04/23 2353 -- -- -- 51 20 98 % -- --   12/04/23 2326 -- -- -- 55 18 98 % -- --   12/04/23 2323 -- -- -- 50 12 98 % -- --   12/04/23 2313 (!) 147/81 -- -- 50 16 98 % -- --   12/04/23 2235 -- -- -- 55 24 98 % -- --   12/04/23 2207 (!) 162/85 -- -- 53 -- 98 % -- --   12/04/23 2146 (!) 170/88 96.9  F (36.1  C) Temporal 58 20 100 % 1.854 m (6' 1\") 99.8 kg (220 lb)        Physical Exam    Eye:  Pupils are equal, round, and reactive.  Extraocular movements intact.    ENT:  No rhinorrhea.  Moist mucus membranes.  Normal tongue and tonsil.    Cardiac:  Regular rate and rhythm.  No murmurs, gallops, or rubs.    Pulmonary:  Clear to auscultation bilaterally.  No wheezes, rales, or rhonchi.    Abdomen:  Positive bowel sounds.  Abdomen is soft and " non-distended, without focal tenderness.    Musculoskeletal:  Normal movement of all extremities without evidence for deficit.    Skin:  Warm and dry without rashes.    Neurologic:  CN II - XII intact.  5/5 strength in all extremities.  Normal sensation throughout.  Normal finger to nose and heel to shin.  2+ patellar reflexes.  Normal gait.    Psychiatric:  Normal affect with appropriate interaction with examiner.      Emergency Department Course     ECG  ECG taken at 2149, ECG read at 2245  Sinus bradycardia  Rate 53 bpm. VA interval 198 ms. QRS duration 94 ms. QT/QTc 452/424 ms. P-R-T axes 65 56 31.     ECG #2  ECG taken at 2225, ECG read at 2245  Sinus bradycardia  Rate 54 bpm. VA interval 196 ms. QRS duration 98 ms. QT/QTc 472/447 ms. P-R-T axes 60 49 16.     Imaging:  MR Brain w/o Contrast   Final Result   IMPRESSION:   1.  Normal head MRI.           Impression:   Normal head MRI    Laboratory:  Labs Ordered and Resulted from Time of ED Arrival to Time of ED Departure   COMPREHENSIVE METABOLIC PANEL - Abnormal       Result Value    Sodium 137      Potassium 4.1      Carbon Dioxide (CO2) 25      Anion Gap 9      Urea Nitrogen 15.5      Creatinine 0.88      GFR Estimate >90      Calcium 9.0      Chloride 103      Glucose 106 (*)     Alkaline Phosphatase 81      AST 21      ALT 28      Protein Total 7.2      Albumin 4.4      Bilirubin Total 0.3     TROPONIN T, HIGH SENSITIVITY - Normal    Troponin T, High Sensitivity <6     CBC WITH PLATELETS AND DIFFERENTIAL    WBC Count 7.5      RBC Count 5.11      Hemoglobin 15.0      Hematocrit 44.7      MCV 88      MCH 29.4      MCHC 33.6      RDW 12.5      Platelet Count 225      % Neutrophils 53      % Lymphocytes 29      % Monocytes 12      % Eosinophils 5      % Basophils 1      % Immature Granulocytes 0      NRBCs per 100 WBC 0      Absolute Neutrophils 4.0      Absolute Lymphocytes 2.2      Absolute Monocytes 0.9      Absolute Eosinophils 0.4      Absolute Basophils  "0.1      Absolute Immature Granulocytes 0.0      Absolute NRBCs 0.0         Emergency Department Course & Assessments:    Interventions:  Medications   sodium chloride 0.9% BOLUS 1,000 mL (0 mLs Intravenous Stopped 12/5/23 0026)     Assessments:  2204 I examined the patient and obtained history as noted above.  0121 I rechecked and updated the patient.    Independent Interpretation (X-rays, CTs, rhythm strip):  None    Consultations/Discussion of Management or Tests:  None    Social Determinants of Health affecting care:   None    Disposition:  The patient was discharged to home.     Impression & Plan      Medical Decision Making:  This very pleasant 53-year-old presents to us with unusual symptoms tonight.  He notes that after dinner he had a spell where he felt as though he could not swallow appropriately.  He noted that his tongue felt somewhat vague and \"tingly.\"  He also described feeling generally lightheaded and sweaty.  He did not experience any associated chest pain or shortness of breath.  However, with these symptoms coming on the way they did, he became concerned that this could represent a repeat intracranial process, prompting his visit to us.  He denies having any visual changes, vertigo, headache, focal weakness or numbness of the extremities, or difficulty with his speech.  His wife concurs with this.    On my exam, he appears clinically well.  I performed a thorough head to toe neurologic exam which is completely unremarkable.  The patient states that his symptoms are \"80% gone.\"  Because of his prior history of stroke, an MRI was obtained.  Fortunately, this study is unremarkable.  I also performed somewhat of a syncope workup considering his described lightheadedness and diaphoresis.  His EKG is normal.  His troponin is undetectable, essentially ruling out acute coronary syndrome.  His hemoglobin is normal.  His electrolytes are reassuring.    On my reassessment, the patient now states that he is " completely resolved of all symptoms.  It is unclear whether this is a TIA or some other constellation of nebulous symptoms.  Nonetheless, I see no indication for admission to the hospital.  The patient is already established with a neurologist and I advised him to be in touch with his neurology team if they choose to perform more of a workup.  I did not feel that he required repeat vessel imaging as he has had this done in the recent past and is unlikely to be germane to his acute symptoms today.  In the end, the patient is comfort with the plan for discharge home and questions were answered.  He knows that he should return to us immediately if he develops any return of his symptoms or if there are other emergent concerns.      Diagnosis:    ICD-10-CM    1. Dysphagia, unspecified type  R13.10     RESOLVED      2. Lightheadedness  R42       3. History of CVA (cerebrovascular accident)  Z86.73            Scribe Disclosure:  I, Edgar Rogers, am serving as a scribe at 10:03 PM on 12/4/2023 to document services personally performed by Trierweiler, Chad A, MD based on my observations and the provider's statements to me.     12/4/2023   Trierweiler, Chad A, MD Trierweiler, Chad A, MD  12/05/23 0344

## 2023-12-05 NOTE — ED NOTES
Bed: ED03  Expected date:   Expected time:   Means of arrival:   Comments:  Stroke triage???   Handoff

## 2024-03-22 ENCOUNTER — HOSPITAL ENCOUNTER (EMERGENCY)
Facility: CLINIC | Age: 54
Discharge: HOME | End: 2024-03-22
Attending: EMERGENCY MEDICINE | Admitting: EMERGENCY MEDICINE
Payer: COMMERCIAL

## 2024-03-22 ENCOUNTER — APPOINTMENT (OUTPATIENT)
Dept: ULTRASOUND IMAGING | Facility: CLINIC | Age: 54
End: 2024-03-22
Attending: EMERGENCY MEDICINE
Payer: COMMERCIAL

## 2024-03-22 VITALS
SYSTOLIC BLOOD PRESSURE: 142 MMHG | BODY MASS INDEX: 29.16 KG/M2 | DIASTOLIC BLOOD PRESSURE: 76 MMHG | WEIGHT: 220 LBS | RESPIRATION RATE: 17 BRPM | OXYGEN SATURATION: 97 % | TEMPERATURE: 97.5 F | HEIGHT: 73 IN | HEART RATE: 84 BPM

## 2024-03-22 DIAGNOSIS — N45.1 LEFT EPIDIDYMITIS: ICD-10-CM

## 2024-03-22 LAB
ALBUMIN UR-MCNC: NEGATIVE MG/DL
APPEARANCE UR: CLEAR
BILIRUB UR QL STRIP: NEGATIVE
COLOR UR AUTO: NORMAL
GLUCOSE UR STRIP-MCNC: NEGATIVE MG/DL
HGB UR QL STRIP: NEGATIVE
KETONES UR STRIP-MCNC: NEGATIVE MG/DL
LEUKOCYTE ESTERASE UR QL STRIP: NEGATIVE
NITRATE UR QL: NEGATIVE
PH UR STRIP: 7 [PH] (ref 5–7)
RBC URINE: <1 /HPF
SP GR UR STRIP: 1.02 (ref 1–1.03)
SQUAMOUS EPITHELIAL: <1 /HPF
UROBILINOGEN UR STRIP-MCNC: NORMAL MG/DL
WBC URINE: <1 /HPF

## 2024-03-22 PROCEDURE — 93976 VASCULAR STUDY: CPT

## 2024-03-22 PROCEDURE — 99284 EMERGENCY DEPT VISIT MOD MDM: CPT | Mod: 25

## 2024-03-22 PROCEDURE — 81001 URINALYSIS AUTO W/SCOPE: CPT | Performed by: EMERGENCY MEDICINE

## 2024-03-22 PROCEDURE — 250N000013 HC RX MED GY IP 250 OP 250 PS 637: Performed by: EMERGENCY MEDICINE

## 2024-03-22 PROCEDURE — 76870 US EXAM SCROTUM: CPT

## 2024-03-22 RX ORDER — LEVOFLOXACIN 500 MG/1
500 TABLET, FILM COATED ORAL DAILY
Qty: 10 TABLET | Refills: 0 | Status: SHIPPED | OUTPATIENT
Start: 2024-03-22 | End: 2024-04-01

## 2024-03-22 RX ORDER — IBUPROFEN 600 MG/1
600 TABLET, FILM COATED ORAL ONCE
Status: COMPLETED | OUTPATIENT
Start: 2024-03-22 | End: 2024-03-22

## 2024-03-22 RX ADMIN — IBUPROFEN 600 MG: 600 TABLET ORAL at 13:18

## 2024-03-22 ASSESSMENT — COLUMBIA-SUICIDE SEVERITY RATING SCALE - C-SSRS
6. HAVE YOU EVER DONE ANYTHING, STARTED TO DO ANYTHING, OR PREPARED TO DO ANYTHING TO END YOUR LIFE?: NO
1. IN THE PAST MONTH, HAVE YOU WISHED YOU WERE DEAD OR WISHED YOU COULD GO TO SLEEP AND NOT WAKE UP?: NO
2. HAVE YOU ACTUALLY HAD ANY THOUGHTS OF KILLING YOURSELF IN THE PAST MONTH?: NO

## 2024-03-22 ASSESSMENT — ACTIVITIES OF DAILY LIVING (ADL)
ADLS_ACUITY_SCORE: 35
ADLS_ACUITY_SCORE: 35

## 2024-03-22 NOTE — ED PROVIDER NOTES
"  History     Chief Complaint:  Testicular/scrotal Pain       HPI   Kai Allen is a 53 year old male with a history of cerebellar stroke and hyperlipidemia who presents with left testicle pain beginning this morning and worsening over time. Pain is now most severe in the posterior aspect. Denies fever, chills, difficulty urinating, and dysuria. Denies drainage or bleeding from the penis. No abdominal pain or vomiting. Patient notes he had a vasectomy about a year ago, and had a difficult healing process. Surrounding inflammation did not go down for some time and his surgical wound had some weeping. He was ultimately placed on antibiotics. Denies allergies to medications.    Independent Historian:   None - Patient Only    Review of External Notes:   none    Medications:    Aspirin 81 mg   Propecia  Altace    Past Medical History:    Alopecia  Umbilical hernia  Obstructive sleep apnea   Cerebellar stroke   Hyperlipidemia     Past Surgical History:    Ventral herniorrhaphy   Lymph node biopsy   Vasectomy    Physical Exam   Patient Vitals for the past 24 hrs:   BP Temp Temp src Pulse Resp SpO2 Height Weight   03/22/24 1045 (!) 142/76 97.5  F (36.4  C) Temporal 84 17 97 % 1.854 m (6' 1\") 99.8 kg (220 lb)        Physical Exam  Nursing note and vitals reviewed.  Constitutional:  Appears well-developed and well-nourished.   HENT:   Head:    Atraumatic.   Mouth/Throat:   Oropharynx is clear and moist. No oropharyngeal exudate.   Eyes:    Pupils are equal, round, and reactive to light.   Neck:    Normal range of motion. Neck supple.      No tracheal deviation present. No thyromegaly present.   Cardiovascular:  Normal rate, regular rhythm, no murmur   Pulmonary/Chest: Breath sounds are clear and equal without wheezes or crackles.  Abdominal:   Soft. Bowel sounds are normal. Exhibits no distension and      no mass. There is no tenderness.      There is no rebound and no guarding.   :   Left epididymal swelling and " tenderness. No scrotal redness, warmth, or swelling. No palpable mass.   Musculoskeletal:  Exhibits no edema.   Lymphadenopathy:  No cervical adenopathy.   Neurological:   Alert and oriented to person, place, and time.   Skin:    Skin is warm and dry. No rash noted. No pallor.     Emergency Department Course   Imaging:  US Testicular & Scrotum w Doppler Ltd   Final Result   IMPRESSION:   1.  Findings are concerning for left epididymitis with reactive small   hydrocele.    2.  No sonographic evidence of testicular torsion.      CLEMENTINA TALLEY MD            SYSTEM ID:  [id number]         Laboratory:  Labs Ordered and Resulted from Time of ED Arrival to Time of ED Departure   ROUTINE UA WITH MICROSCOPIC REFLEX TO CULTURE - Normal       Result Value    Color Urine Light Yellow      Appearance Urine Clear      Glucose Urine Negative      Bilirubin Urine Negative      Ketones Urine Negative      Specific Gravity Urine 1.020      Blood Urine Negative      pH Urine 7.0      Protein Albumin Urine Negative      Urobilinogen Urine Normal      Nitrite Urine Negative      Leukocyte Esterase Urine Negative      RBC Urine <1      WBC Urine <1      Squamous Epithelials Urine <1          Emergency Department Course & Assessments:       Interventions:  Medications   ibuprofen (ADVIL/MOTRIN) tablet 600 mg (600 mg Oral $Given 3/22/24 1318)        Independent Interpretation (X-rays, CTs, rhythm strip):  None    Assessments/Consultations/Discussion of Management or Tests:  ED Course as of 03/22/24 1321   Fri Mar 22, 2024   1113 I obtained the history and examined the patient as noted above.    1319 I rechecked and updated the patient. They were amenable to plan for discharge.        Social Determinants of Health affecting care:   None    Disposition:  The patient was discharged to home.     Impression & Plan    Medical Decision Making:  Kai Allen is a 53 year old male who presents for evaluation of testicular pain. Broad  differential diagnosis was considered including torsion, epididymitis, UTI, sexually transmitted infection, hernia, varicocele, trauma, hematoma, etc.  The workup here is consistent with epididymitis.  Given age and risk factors, will use Levaquin for antibiotics to cover common organisms. I discussed the risks and benefits of Fluoroquinolones with the patient, including tendon rupture or neurologic problems, and the patient agrees to the Levaquin.  Urine will be cultured. Questions were answered.  Stable at discharge with Urology follow-up.       Diagnosis:    ICD-10-CM    1. Left epididymitis  N45.1            Discharge Medications:  New Prescriptions    LEVOFLOXACIN (LEVAQUIN) 500 MG TABLET    Take 1 tablet (500 mg) by mouth daily for 10 days      Scribe Disclosure:  TIMBO, Amy Dhillon, am serving as a scribe at 11:05 AM on 3/22/2024 to document services personally performed by Sally Rice MD based on my observations and the provider's statements to me.     3/22/2024   Sally Rice MD [place], Cheri Lee, MD  03/22/24 1921

## 2024-03-22 NOTE — ED TRIAGE NOTES
Reports L testicular swelling and mild pain since this morning. UC referred to ED for ultrasound. Denies injury or trauma,     Triage Assessment (Adult)       Row Name 03/22/24 1044          Triage Assessment    Airway WDL WDL        Respiratory WDL    Respiratory WDL WDL        Skin Circulation/Temperature WDL    Skin Circulation/Temperature WDL WDL        Cardiac WDL    Cardiac WDL WDL        Peripheral/Neurovascular WDL    Peripheral Neurovascular WDL WDL        Cognitive/Neuro/Behavioral WDL    Cognitive/Neuro/Behavioral WDL WDL

## 2024-06-22 ENCOUNTER — HEALTH MAINTENANCE LETTER (OUTPATIENT)
Age: 54
End: 2024-06-22

## 2024-12-10 ENCOUNTER — TELEPHONE (OUTPATIENT)
Dept: SLEEP MEDICINE | Facility: CLINIC | Age: 54
End: 2024-12-10
Payer: COMMERCIAL

## 2024-12-10 NOTE — TELEPHONE ENCOUNTER
Fax received from patients pcp at [facility] to schedule patient for a return visit with Bennett Goltz. LVM for patient to contact sleep clinic to schedule.   Georgia Marshall MA

## 2025-04-30 ENCOUNTER — OFFICE VISIT (OUTPATIENT)
Dept: SLEEP MEDICINE | Facility: CLINIC | Age: 55
End: 2025-04-30
Payer: COMMERCIAL

## 2025-04-30 VITALS
SYSTOLIC BLOOD PRESSURE: 153 MMHG | OXYGEN SATURATION: 97 % | BODY MASS INDEX: 34.85 KG/M2 | HEART RATE: 76 BPM | HEIGHT: 73 IN | WEIGHT: 263 LBS | DIASTOLIC BLOOD PRESSURE: 94 MMHG

## 2025-04-30 DIAGNOSIS — G47.33 OSA (OBSTRUCTIVE SLEEP APNEA): Primary | ICD-10-CM

## 2025-04-30 PROBLEM — E78.5 HYPERLIPIDEMIA: Status: ACTIVE | Noted: 2018-05-01

## 2025-04-30 PROBLEM — G47.30 SLEEP APNEA, UNSPECIFIED TYPE: Status: ACTIVE | Noted: 2018-08-14

## 2025-04-30 PROBLEM — L65.9 ALOPECIA: Status: ACTIVE | Noted: 2021-11-21

## 2025-04-30 PROBLEM — I63.9 CEREBELLAR STROKE (H): Status: ACTIVE | Noted: 2018-08-02

## 2025-04-30 PROBLEM — I10 ESSENTIAL (PRIMARY) HYPERTENSION: Status: ACTIVE | Noted: 2018-08-14

## 2025-04-30 PROCEDURE — 3077F SYST BP >= 140 MM HG: CPT | Performed by: INTERNAL MEDICINE

## 2025-04-30 PROCEDURE — 1126F AMNT PAIN NOTED NONE PRSNT: CPT | Performed by: INTERNAL MEDICINE

## 2025-04-30 PROCEDURE — 99204 OFFICE O/P NEW MOD 45 MIN: CPT | Performed by: INTERNAL MEDICINE

## 2025-04-30 PROCEDURE — 3079F DIAST BP 80-89 MM HG: CPT | Performed by: INTERNAL MEDICINE

## 2025-04-30 RX ORDER — VENLAFAXINE HYDROCHLORIDE 75 MG/1
150 CAPSULE, EXTENDED RELEASE ORAL AT BEDTIME
COMMUNITY
Start: 2024-05-15

## 2025-04-30 ASSESSMENT — SLEEP AND FATIGUE QUESTIONNAIRES
HOW LIKELY ARE YOU TO NOD OFF OR FALL ASLEEP WHILE SITTING INACTIVE IN A PUBLIC PLACE: WOULD NEVER DOZE
HOW LIKELY ARE YOU TO NOD OFF OR FALL ASLEEP WHILE SITTING QUIETLY AFTER LUNCH WITHOUT ALCOHOL: WOULD NEVER DOZE
HOW LIKELY ARE YOU TO NOD OFF OR FALL ASLEEP WHILE SITTING AND READING: WOULD NEVER DOZE
HOW LIKELY ARE YOU TO NOD OFF OR FALL ASLEEP WHILE SITTING AND TALKING TO SOMEONE: WOULD NEVER DOZE
HOW LIKELY ARE YOU TO NOD OFF OR FALL ASLEEP WHILE WATCHING TV: WOULD NEVER DOZE
HOW LIKELY ARE YOU TO NOD OFF OR FALL ASLEEP WHEN YOU ARE A PASSENGER IN A CAR FOR AN HOUR WITHOUT A BREAK: WOULD NEVER DOZE
HOW LIKELY ARE YOU TO NOD OFF OR FALL ASLEEP WHILE LYING DOWN TO REST IN THE AFTERNOON WHEN CIRCUMSTANCES PERMIT: SLIGHT CHANCE OF DOZING
HOW LIKELY ARE YOU TO NOD OFF OR FALL ASLEEP IN A CAR, WHILE STOPPED FOR A FEW MINUTES IN TRAFFIC: WOULD NEVER DOZE

## 2025-04-30 NOTE — PROGRESS NOTES
Name: Kai Allen MRN# [id number]   Age: 55 year old YOB: 1970      Date of Consultation: April 30, 2025  Consultation is requested by: No referring provider defined for this encounter. No ref. provider found  Primary care provider: Galina Chaudhry        Reason for Sleep Consult:      Kai Allen returns regarding effectiveness of his CPAP and reestablishing follow-up          Assessment and Plan:      Summary Sleep Diagnoses:  Obstructive sleep apnea currently effectively treated with excellent adherence to therapy     Comorbid Diagnoses:  Hypertension  Cerebellar stroke 7 years ago without sequelae     Summary Recommendations(things to be done):  Return to clinic in 2 years or earlier if you have recurrent problems with snoring, sleep disruption or nonrestorative sleep        Summary Counseling (items discussed):    We did  on long-term weight reduction discussing tirzepatide as well as behavioral methods-did not expressed interest at this time and referral.                HISTORY PRESENT ILLNESS:      Kai Allen is a 55 year old year old male with a history of moderate obstructive sleep apnea reestablishing care for treatment of his moderate positional sleep apnea without hypoxemia based on polysomnographic studies in 2018 with subsequent weight gain of 45 pounds.  He is currently using a nasal pillow mask with comfort and no problems with recurrent snoring or significant mask leak.  He has gained 45 pounds since his previous weight however his CPAP pressure has been effective deploying at 11 cmH2O midway between the range of 9-16.        PREVIOUS POLYSOMNOGRAPHIC SLEEP TESTING:  DATE: 10/30/2018 at 218 pounds  31 arousals per hour with 3 reduced N3 sleep 4%  AHI: 15     CPAP download past 30 days: He is currently using auto titration CPAP 9-16 deployed at 11 cm of water with average use nearly 7 hours nightly with full adherence to greater than 4 hours per night.                 SLEEP-WAKE SCHEDULE:       Work/School Days: Patient goes to school/work: (Patient-Rptd) Yes           Usually gets into bed at (Patient-Rptd) 10:00  Takes patient about (Patient-Rptd) 15 min to fall asleep  Has trouble falling asleep (Patient-Rptd) 0 nights per week  Wakes up in the middle of the night (Patient-Rptd) zero to one times times.  Wakes up due to (Patient-Rptd) Uncertain  He has trouble falling back asleep   times a week.   It usually takes   to get back to sleep  Patient is usually up at (Patient-Rptd) 8:00  Uses alarm: (Patient-Rptd) Yes     Weekends/Non-work Days/All Other Days:  Usually gets into bed at (Patient-Rptd) 11:00   Takes patient about (Patient-Rptd) 15 min to fall asleep  Patient is usually up at (Patient-Rptd) 8:00  Uses alarm: (Patient-Rptd) No     Sleep Need  Patient gets  (Patient-Rptd) 8 hours sleep on average            Patient thinks he needs about (Patient-Rptd) 8 hours sleep     Kai BRUNO Dayannaeleonora prefers to sleep in this position(s): (Patient-Rptd) Back, Side   Patient states they do the following activities in bed: (Patient-Rptd) Read     Naps  Patient takes a purposeful nap (Patient-Rptd) zero times a week and naps are usually (Patient-Rptd) n/a in duration  He feels better after a nap:    He dozes off unintentionally   days per week  Patient has had a driving accident or near-miss due to sleepiness/drowsiness: (Patient-Rptd) No        SLEEP DISRUPTIONS:     Breathing/Snoring off CPAP     Snoring:(Patient-Rptd) Yes  Other people complain about his snoring: (Patient-Rptd) Yes  Others observehe stops breathing in his sleep: (Patient-Rptd) Yes  He has issues with the following:       Movement:  No typical RLS symptoms  Pain, discomfort, with an urge to move:  (Patient-Rptd) Yes  Happens when he is resting:  (Patient-Rptd) Yes  Happens more at night:     Patient has been told he kicks his legs at night:                   Behaviors in Wakefulness/Sleep:                     Kai Allen has experienced the following behaviors while sleeping:    He has experienced sudden muscle weakness during the day:          Is there anything else you would like your sleep provider to know:          CAFFEINE AND OTHER SUBSTANCES:     Patient consumes caffeinated beverages per day:  (Patient-Rptd) 2  Last caffeine use is usually: (Patient-Rptd) noon  List of any prescribed or over the counter stimulants that patient takes:    List of any prescribed or over the counter sleep medication patient takes:    List of previous sleep medications that patient has tried:    Patient drinks alcohol to help them sleep: (Patient-Rptd) No  Patient drinks alcohol near bedtime: (Patient-Rptd) No     Family History:  Patient has a family member been diagnosed with a sleep disorder: (Patient-Rptd) No                   SCALES:         EPWORTH SLEEPINESS SCALE      EPWORTH SLEEPINESS SCALE           4/30/2025     7:56 AM    [place] Sleepiness Scale ( OMAR Kaye  [id number]<br>ESS - [place]/English - Final version - 21 Nov 07 - [facility] [place].)   Sitting and reading Would never doze   Watching TV Would never doze   Sitting, inactive in a public place (e.g. a theatre or a meeting) Would never doze   As a passenger in a car for an hour without a break Would never doze   Lying down to rest in the afternoon when circumstances permit Slight chance of dozing   Sitting and talking to someone Would never doze   Sitting quietly after a lunch without alcohol Would never doze   In a car, while stopped for a few minutes in traffic Would never doze   [place] Score (MC) 1   [place] Score (Sleep) 1        Patient-reported            INSOMNIA SEVERITY INDEX (SILAS)            4/30/2025     7:48 AM   Insomnia Severity Index (SILAS)   Difficulty falling asleep 0   Difficulty staying asleep 1   Problems waking up too early 0   How SATISFIED/DISSATISFIED are you with your CURRENT sleep pattern? 1   How NOTICEABLE to others do you think  your sleep problem is in terms of impairing the quality of your life? 1   How WORRIED/DISTRESSED are you about your current sleep problem? 0   To what extent do you consider your sleep problem to INTERFERE with your daily functioning (e.g. daytime fatigue, mood, ability to function at work/daily chores, concentration, memory, mood, etc.) CURRENTLY? 0   SILAS Total Score 3        Patient-reported         Guidelines for Scoring/Interpretation:  Total score categories:  0-7 = No clinically significant insomnia   8-14 = Subthreshold insomnia   15-21 = Clinical insomnia (moderate severity)  22-28 = Clinical insomnia (severe)  Used via courtesy of www.[employer].va.gov with permission from Tommy Barragan PhD., [facility]        STOP BANG           4/30/2025     7:56 AM   STOP BANG Questionnaire (  2008, the American Society of Anesthesiologists, Inc[employer]   1. Snoring - Do you snore loudly (louder than talking or loud enough to be heard through closed doors)? Yes   2. Tired - Do you often feel tired, fatigued, or sleepy during daytime? No   3. Observed - Has anyone observed you stop breathing during your sleep? Yes   4. Blood pressure - Do you have or are you being treated for high blood pressure? Yes   5. BMI - BMI more than 35 kg/m2? No   6. Age - Age over 50 yr old? Yes   7. Neck circumference - Neck circumference greater than 40 cm? No   8. Gender - Gender male? Yes   STOP BANG Score (MC): 5 (High risk of NELLY)              11/14/2018    10:11 AM    [place] Sleepiness Scale ( OMAR Kaye  [id number]<br>ESS - [place]/English - Final version - 21 Nov 07 - [facility] [place].)   [place] Score (Sleep) 3            INSOMNIA SEVERITY INDEX (SILAS)            11/14/2018    10:11 AM   Insomnia Severity Index (SILAS)   Difficulty falling asleep 1   Difficulty staying asleep 1   Problems waking up too early 1   How SATISFIED/DISSATISFIED are you with your CURRENT sleep pattern? 2   How NOTICEABLE  to others do you think your sleep problem is in terms of impairing the quality of your life? 1   How WORRIED/DISTRESSED are you about your current sleep problem? 2   To what extent do you consider your sleep problem to INTERFERE with your daily functioning (e.g. daytime fatigue, mood, ability to function at work/daily chores, concentration, memory, mood, etc.) CURRENTLY? 1   SILAS Total Score 9         Guidelines for Scoring/Interpretation:  Total score categories:  0-7 = No clinically significant insomnia   8-14 = Subthreshold insomnia   15-21 = Clinical insomnia (moderate severity)  22-28 = Clinical insomnia (severe)  Used via courtesy of www.[employer].va.gov with permission from Tommy Barragan PhD., [facility]        STOP BANG           3/22/2024    10:45 AM   STOP BANG Questionnaire (  2008, the American Society of Anesthesiologists, Inc. [employer]   B/P Clinic: 142/76   BMI Clinic: 29.03               Allergies:    Allergies        Allergies   Allergen Reactions    No Known Allergies                   Problem List:          Patient Active Problem List   Diagnosis    Umbilical hernia    Routine general medical examination at a health care facility    CARDIOVASCULAR SCREENING; LDL GOAL LESS THAN 160    NELLY (obstructive sleep apnea)              MEDICATIONS:      Current Outpatient Prescriptions          Current Outpatient Medications   Medication Sig Dispense Refill    aspirin 81 MG EC tablet Take 81 mg by mouth        finasteride (PROPECIA PRO-ANA) 1 MG tablet Take 1 tablet by mouth daily. 30 tablet 6    Probiotic Product (PROBIOTIC DAILY PO)          ramipril (ALTACE) 10 MG capsule Take by mouth daily        valerian root 530 MG CAPS capsule                  Problem List:        Patient Active Problem List     Diagnosis Date Noted    NELLY (obstructive sleep apnea) 11/14/2018       Priority: Medium    CARDIOVASCULAR SCREENING; LDL GOAL LESS THAN 160 10/31/2010       Priority:  Medium    Routine general medical examination at a health care facility 02/25/2008       Priority: Medium    Umbilical hernia 10/06/2003       Priority: Medium       Problem list name updated by automated process. Provider to review            Past Medical/Surgical History:  Past Medical History        Past Medical History:   Diagnosis Date    Alopecia, unspecified      Sebaceous cyst       L upper face    Umbilical hernia without mention of obstruction or gangrene 4/25/02         Past Surgical History         Past Surgical History:   Procedure Laterality Date    HERNIORRHAPHY VENTRAL N/A 12/2/2015     Procedure: HERNIORRHAPHY VENTRAL;  Surgeon: Clinton Wisdom MD;  Location: [facility]    ZZC NONSPECIFIC PROCEDURE   ~95     per pt - Hosp for viral inf/dehydration    ZZC NONSPECIFIC PROCEDURE   high school     rt c lymph node bx            Social History:  Social History   Social History            Socioeconomic History    Marital status:        Spouse name: Not on file    Number of children: 0    Years of education: Not on file    Highest education level: Not on file   Occupational History    Occupation:    Tobacco Use    Smoking status: Never    Smokeless tobacco: Never   Substance and Sexual Activity    Alcohol use: Yes       Comment: 4 beers /week- none since stroke 8/2018    Drug use: Not on file    Sexual activity: Yes       Partners: Female       Comment:  for 2 years   Other Topics Concern     Service Not Asked    Blood Transfusions Not Asked    Caffeine Concern Not Asked    Occupational Exposure Not Asked    Hobby Hazards Not Asked    Sleep Concern Not Asked    Stress Concern Not Asked    Weight Concern Not Asked    Special Diet Not Asked    Back Care Not Asked    Exercise No       Comment: willbegin    Bike Helmet Not Asked    Seat Belt Not Asked    Self-Exams Not Asked    Parent/sibling w/ CABG, MI or angioplasty before 65F 55M? Not Asked   Social History Narrative       2006      Social Drivers of Health           Financial Resource Strain: Low Risk  (3/22/2024)     [facility]     Financial Resource Strain      Difficulty of Paying Living Expenses: 3      Difficulty of Paying Living Expenses: Not on file   Food Insecurity: No Food Insecurity (3/22/2024)     [facility] from [facility]     Food Insecurity      Do you worry your food will run out before you are able to buy more?: 1   Transportation Needs: No Transportation Needs (3/22/2024)     Received from [facility]     Transportation Needs      Does lack of transportation keep you from medical appointments?: 1      Does lack of transportation keep you from work, meetings or getting things that you need?: 1   Physical Activity: Not on file   Stress: Not on file   Social Connections: Socially Integrated (3/22/2024)     Received from [facility]     Social Connections      Do you often feel lonely or isolated from those around you?: 0   Interpersonal Safety: Not on file   Housing Stability: Low Risk  (3/22/2024)     Received from [facility]     Housing Stability      What is your housing situation today?: 1            Family History:  Family History         Family History   Problem Relation Age of Onset    Diabetes Maternal Grandmother      C.A.D. Maternal Grandmother      Arthritis Maternal Grandmother      Heart Disease Maternal Grandmother           CABG at 79 yo    Cancer Maternal Grandfather            of lung ca smoker                 Physical Examination:         GENERAL: alert and no distress  EYES: Eyes grossly normal to inspection.  No discharge or erythema, or obvious scleral/conjunctival abnormalities.  RESP: No audible wheeze, cough, or visible cyanosis.    SKIN: Visible skin clear. No significant rash, abnormal pigmentation or  lesions.  NEURO: Cranial nerves grossly intact.  Mentation and speech appropriate for age.  PSYCH: Appropriate affect, tone, and pace of words               Data: All pertinent previous laboratory data reviewed          Recent Labs   Lab Test 12/04/23 2204      POTASSIUM 4.1   CHLORIDE 103   CO2 25   ANIONGAP 9   *   BUN 15.5   CR 0.88   ANISHA 9.0             Recent Labs   Lab Test 12/04/23 2204   WBC 7.5   RBC 5.11   HGB 15.0   HCT 44.7   MCV 88   MCH 29.4   MCHC 33.6   RDW 12.5                Recent Labs   Lab Test 12/04/23 2204   PROTTOTAL 7.2   ALBUMIN 4.4   BILITOTAL 0.3   ALKPHOS 81   AST 21   ALT 28             TSH (mU/L)   Date Value   01/30/2007 2.91         SUSAN MERRITT MD 4/30/2025      Total time spent reviewing medical records including previous testing and interpretation as well as direct patient contact and documentation on this date: 45 minutes

## 2025-04-30 NOTE — PROCEDURES
Name: Kai Allen MRN# [id number]   Age: 55 year old YOB: 1970     Date of Consultation: April 30, 2025  Consultation is requested by: No referring provider defined for this encounter. No ref. provider found  Primary care provider: Galina Chaudhry       Reason for Sleep Consult:     Kia Allen returns regarding effectiveness of his CPAP and reestablishing follow-up         Assessment and Plan:     Summary Sleep Diagnoses:  Obstructive sleep apnea currently effectively treated with excellent adherence to therapy    Comorbid Diagnoses:  Hypertension  Cerebellar stroke 7 years ago without sequelae    Summary Recommendations(things to be done):  Return to clinic in 2 years or earlier if you have recurrent problems with snoring, sleep disruption or nonrestorative sleep      Summary Counseling (items discussed):    We did  on long-term weight reduction discussing tirzepatide as well as behavioral methods-did not expressed interest at this time and referral.             HISTORY PRESENT ILLNESS:     Kai Allen is a 55 year old year old male with a history of moderate obstructive sleep apnea reestablishing care for treatment of his moderate positional sleep apnea without hypoxemia based on polysomnographic studies in 2018 with subsequent weight gain of 45 pounds.  He is currently using a nasal pillow mask with comfort and no problems with recurrent snoring or significant mask leak.  He has gained 45 pounds since his previous weight however his CPAP pressure has been effective deploying at 11 cmH2O midway between the range of 9-16.      PREVIOUS POLYSOMNOGRAPHIC SLEEP TESTING:  DATE: 10/30/2018 at 218 pounds  31 arousals per hour with 3 reduced N3 sleep 4%  AHI: 15    CPAP download past 30 days: He is currently using auto titration CPAP 9-16 deployed at 11 cm of water with average use nearly 7 hours nightly with full adherence to greater than 4 hours per night.              SLEEP-WAKE SCHEDULE:      Work/School Days: Patient goes to school/work: (Patient-Rptd) Yes   Usually gets into bed at (Patient-Rptd) 10:00  Takes patient about (Patient-Rptd) 15 min to fall asleep  Has trouble falling asleep (Patient-Rptd) 0 nights per week  Wakes up in the middle of the night (Patient-Rptd) zero to one times times.  Wakes up due to (Patient-Rptd) Uncertain  He has trouble falling back asleep   times a week.   It usually takes   to get back to sleep  Patient is usually up at (Patient-Rptd) 8:00  Uses alarm: (Patient-Rptd) Yes    Weekends/Non-work Days/All Other Days:  Usually gets into bed at (Patient-Rptd) 11:00   Takes patient about (Patient-Rptd) 15 min to fall asleep  Patient is usually up at (Patient-Rptd) 8:00  Uses alarm: (Patient-Rptd) No    Sleep Need  Patient gets  (Patient-Rptd) 8 hours sleep on average   Patient thinks he needs about (Patient-Rptd) 8 hours sleep    Kai Allen prefers to sleep in this position(s): (Patient-Rptd) Back, Side   Patient states they do the following activities in bed: (Patient-Rptd) Read    Naps  Patient takes a purposeful nap (Patient-Rptd) zero times a week and naps are usually (Patient-Rptd) n/a in duration  He feels better after a nap:    He dozes off unintentionally   days per week  Patient has had a driving accident or near-miss due to sleepiness/drowsiness: (Patient-Rptd) No      SLEEP DISRUPTIONS:    Breathing/Snoring off CPAP    Snoring:(Patient-Rptd) Yes  Other people complain about his snoring: (Patient-Rptd) Yes  Others observehe stops breathing in his sleep: (Patient-Rptd) Yes  He has issues with the following:      Movement:  No typical RLS symptoms  Pain, discomfort, with an urge to move:  (Patient-Rptd) Yes  Happens when he is resting:  (Patient-Rptd) Yes  Happens more at night:     Patient has been told he kicks his legs at night:        Behaviors in Wakefulness/Sleep:                    Kai Allen has experienced the following  behaviors while sleeping:    He has experienced sudden muscle weakness during the day:        Is there anything else you would like your sleep provider to know:        CAFFEINE AND OTHER SUBSTANCES:    Patient consumes caffeinated beverages per day:  (Patient-Rptd) 2  Last caffeine use is usually: (Patient-Rptd) noon  List of any prescribed or over the counter stimulants that patient takes:    List of any prescribed or over the counter sleep medication patient takes:    List of previous sleep medications that patient has tried:    Patient drinks alcohol to help them sleep: (Patient-Rptd) No  Patient drinks alcohol near bedtime: (Patient-Rptd) No    Family History:  Patient has a family member been diagnosed with a sleep disorder: (Patient-Rptd) No                SCALES:       EPWORTH SLEEPINESS SCALE     EPWORTH SLEEPINESS SCALE         4/30/2025     7:56 AM    [place] Sleepiness Scale ( OMAR Kaye  [id number]<br>ESS - [place]/English - Final version - 21 Nov 07 - [facility].)   Sitting and reading Would never doze   Watching TV Would never doze   Sitting, inactive in a public place (e.g. a theatre or a meeting) Would never doze   As a passenger in a car for an hour without a break Would never doze   Lying down to rest in the afternoon when circumstances permit Slight chance of dozing   Sitting and talking to someone Would never doze   Sitting quietly after a lunch without alcohol Would never doze   In a car, while stopped for a few minutes in traffic Would never doze   [place] Score (MC) 1   [place] Score (Sleep) 1        Patient-reported         INSOMNIA SEVERITY INDEX (SILAS)          4/30/2025     7:48 AM   Insomnia Severity Index (SILAS)   Difficulty falling asleep 0   Difficulty staying asleep 1   Problems waking up too early 0   How SATISFIED/DISSATISFIED are you with your CURRENT sleep pattern? 1   How NOTICEABLE to others do you think your sleep problem is in terms of impairing the quality of your life?  1   How WORRIED/DISTRESSED are you about your current sleep problem? 0   To what extent do you consider your sleep problem to INTERFERE with your daily functioning (e.g. daytime fatigue, mood, ability to function at work/daily chores, concentration, memory, mood, etc.) CURRENTLY? 0   SILAS Total Score 3        Patient-reported       Guidelines for Scoring/Interpretation:  Total score categories:  0-7 = No clinically significant insomnia   8-14 = Subthreshold insomnia   15-21 = Clinical insomnia (moderate severity)  22-28 = Clinical insomnia (severe)  Used via courtesy of www.[employer] Ice Cream[employer].va.gov with permission from Tommy Barragan PhD., [facility]      STOP BANG         4/30/2025     7:56 AM   STOP BANG Questionnaire (  2008, the American Society of Anesthesiologists, Inc. [employer]   1. Snoring - Do you snore loudly (louder than talking or loud enough to be heard through closed doors)? Yes   2. Tired - Do you often feel tired, fatigued, or sleepy during daytime? No   3. Observed - Has anyone observed you stop breathing during your sleep? Yes   4. Blood pressure - Do you have or are you being treated for high blood pressure? Yes   5. BMI - BMI more than 35 kg/m2? No   6. Age - Age over 50 yr old? Yes   7. Neck circumference - Neck circumference greater than 40 cm? No   8. Gender - Gender male? Yes   STOP BANG Score (MC): 5 (High risk of NELLY)            11/14/2018    10:11 AM    [place] Sleepiness Scale ( OMAR Kaye  [id number]<br>ESS - [place]/English - Final version - 21 Nov 07 - [facility] [place].)   [place] Score (Sleep) 3         INSOMNIA SEVERITY INDEX (SILAS)          11/14/2018    10:11 AM   Insomnia Severity Index (SILAS)   Difficulty falling asleep 1   Difficulty staying asleep 1   Problems waking up too early 1   How SATISFIED/DISSATISFIED are you with your CURRENT sleep pattern? 2   How NOTICEABLE to others do you think your sleep problem is in terms of impairing the quality of  your life? 1   How WORRIED/DISTRESSED are you about your current sleep problem? 2   To what extent do you consider your sleep problem to INTERFERE with your daily functioning (e.g. daytime fatigue, mood, ability to function at work/daily chores, concentration, memory, mood, etc.) CURRENTLY? 1   SILAS Total Score 9       Guidelines for Scoring/Interpretation:  Total score categories:  0-7 = No clinically significant insomnia   8-14 = Subthreshold insomnia   15-21 = Clinical insomnia (moderate severity)  22-28 = Clinical insomnia (severe)  Used via courtesy of www.[employer].va.gov with permission from Tommy Barragan PhD., [facility]      STOP BANG         3/22/2024    10:45 AM   STOP BANG Questionnaire (  2008, the American Society of Anesthesiologists, Inc. [employer]   B/P Clinic: 142/76   BMI Clinic: 29.03           Allergies:    Allergies   Allergen Reactions    No Known Allergies             Problem List:     Patient Active Problem List   Diagnosis    Umbilical hernia    Routine general medical examination at a health care facility    CARDIOVASCULAR SCREENING; LDL GOAL LESS THAN 160    NELLY (obstructive sleep apnea)            MEDICATIONS:     Current Outpatient Medications   Medication Sig Dispense Refill    aspirin 81 MG EC tablet Take 81 mg by mouth      finasteride (PROPECIA PRO-ANA) 1 MG tablet Take 1 tablet by mouth daily. 30 tablet 6    Probiotic Product (PROBIOTIC DAILY PO)       ramipril (ALTACE) 10 MG capsule Take by mouth daily      valerian root 530 MG CAPS capsule          Problem List:  Patient Active Problem List    Diagnosis Date Noted    NELLY (obstructive sleep apnea) 11/14/2018     Priority: Medium    CARDIOVASCULAR SCREENING; LDL GOAL LESS THAN 160 10/31/2010     Priority: Medium    Routine general medical examination at a health care facility 02/25/2008     Priority: Medium    Umbilical hernia 10/06/2003     Priority: Medium     Problem list name updated by  automated process. Provider to review          Past Medical/Surgical History:  Past Medical History:   Diagnosis Date    Alopecia, unspecified     Sebaceous cyst     L upper face    Umbilical hernia without mention of obstruction or gangrene 4/25/02     Past Surgical History:   Procedure Laterality Date    HERNIORRHAPHY VENTRAL N/A 12/2/2015    Procedure: HERNIORRHAPHY VENTRAL;  Surgeon: Clinton Wisdom MD;  Location: [facility] NONSPECIFIC PROCEDURE  ~95    per pt - Hosp for viral inf/dehydration    ZZ NONSPECIFIC PROCEDURE  high school    rt c lymph node bx       Social History:  Social History     Socioeconomic History    Marital status:      Spouse name: Not on file    Number of children: 0    Years of education: Not on file    Highest education level: Not on file   Occupational History    Occupation:    Tobacco Use    Smoking status: Never    Smokeless tobacco: Never   Substance and Sexual Activity    Alcohol use: Yes     Comment: 4 beers /week- none since stroke 8/2018    Drug use: Not on file    Sexual activity: Yes     Partners: Female     Comment:  for 2 years   Other Topics Concern     Service Not Asked    Blood Transfusions Not Asked    Caffeine Concern Not Asked    Occupational Exposure Not Asked    Hobby Hazards Not Asked    Sleep Concern Not Asked    Stress Concern Not Asked    Weight Concern Not Asked    Special Diet Not Asked    Back Care Not Asked    Exercise No     Comment: willbegin    Bike Helmet Not Asked    Seat Belt Not Asked    Self-Exams Not Asked    Parent/sibling w/ CABG, MI or angioplasty before 65F 55M? Not Asked   Social History Narrative     2006     Social Drivers of Health     Financial Resource Strain: Low Risk  (3/22/2024)    [facility]    Financial Resource Strain     Difficulty of Paying Living Expenses: 3     Difficulty of Paying Living Expenses: Not on file   Food Insecurity: No Food  Insecurity (3/22/2024)    [facility]    Food Insecurity     Do you worry your food will run out before you are able to buy more?: 1   Transportation Needs: No Transportation Needs (3/22/2024)    Received from [facility]    Transportation Needs     Does lack of transportation keep you from medical appointments?: 1     Does lack of transportation keep you from work, meetings or getting things that you need?: 1   Physical Activity: Not on file   Stress: Not on file   Social Connections: Socially Integrated (3/22/2024)    Received from [facility]    Social Connections     Do you often feel lonely or isolated from those around you?: 0   Interpersonal Safety: Not on file   Housing Stability: Low Risk  (3/22/2024)    Received from [facility]    Housing Stability     What is your housing situation today?: 1       Family History:  Family History   Problem Relation Age of Onset    Diabetes Maternal Grandmother     C.A.D. Maternal Grandmother     Arthritis Maternal Grandmother     Heart Disease Maternal Grandmother         CABG at 79 yo    Cancer Maternal Grandfather          of lung ca smoker            Physical Examination:       GENERAL: alert and no distress  EYES: Eyes grossly normal to inspection.  No discharge or erythema, or obvious scleral/conjunctival abnormalities.  RESP: No audible wheeze, cough, or visible cyanosis.    SKIN: Visible skin clear. No significant rash, abnormal pigmentation or lesions.  NEURO: Cranial nerves grossly intact.  Mentation and speech appropriate for age.  PSYCH: Appropriate affect, tone, and pace of words                Data: All pertinent previous laboratory data reviewed     Recent Labs   Lab Test 23  2204      POTASSIUM 4.1   CHLORIDE 103   CO2 25   ANIONGAP 9   *   BUN 15.5   CR 0.88   ANISHA 9.0       Recent Labs    Lab Test 12/04/23 2204   WBC 7.5   RBC 5.11   HGB 15.0   HCT 44.7   MCV 88   MCH 29.4   MCHC 33.6   RDW 12.5          Recent Labs   Lab Test 12/04/23 2204   PROTTOTAL 7.2   ALBUMIN 4.4   BILITOTAL 0.3   ALKPHOS 81   AST 21   ALT 28       TSH (mU/L)   Date Value   01/30/2007 2.91       SUSAN MERRITT MD 4/30/2025     Total time spent reviewing medical records including previous testing and interpretation as well as direct patient contact and documentation on this date: 45 minutes

## 2025-04-30 NOTE — PATIENT INSTRUCTIONS
"          MY TREATMENT INFORMATION FOR SLEEP APNEA-  Kai Allen    DOCTOR : SUSAN MERRITT MD    Frequently asked questions:  1. What is Obstructive Sleep Apnea (NELLY)? NELLY is the most common type of sleep apnea. Apnea means, \"without breath.\"  Apnea is most often caused by narrowing or collapse of the upper airway as muscles relax during sleep.   Almost everyone has occasional apneas. Most people with sleep apnea have had brief interruptions at night frequently for many years.  The severity of sleep apnea is related to how frequent and severe the events are.   2. What are the consequences of NELLY? Symptoms include: feeling sleepy during the day, snoring loudly, gasping or stopping of breathing, trouble sleeping, and occasionally morning headaches or heartburn at night.  Sleepiness can be serious and even increase the risk of falling asleep while driving. Other health consequences may include development of high blood pressure and other cardiovascular disease in persons who are susceptible. Untreated NELLY  can contribute to heart disease, stroke and diabetes.   3. What are the treatment options? In most situations, sleep apnea is a lifelong disease that must be managed with daily therapy. Medications are not effective for sleep apnea and surgery is generally not considered until other therapies have been tried. Your treatment is your choice . Continuous Positive Airway (CPAP) works right away and is the therapy that is effective in nearly everyone. An oral device to hold your jaw forward is usually the next most reliable option. Other options include postioning devices (to keep you off your back), weight loss, and surgery including a tongue pacing device. There is more detail about some of these options below.  4. Are my sleep studies covered by insurance? Although we will request verification of coverage, we advise you also check in advance of the study to ensure there is coverage.    Important tips for those " choosing CPAP and similar devices  REMEMBER-IF YOU RECEIVE A CALL FROM  [phone]-->IT IS TO SETUP A DEVICE  For new devices, sign up for device AMISH to monitor your device for your followup visits  We encourage you to utilize the [employer] amish or website ( https://[employer]/ ) to monitor your therapy progress and share the data with your healthcare team when you discuss your sleep apnea.                                                    Know your equipment:  CPAP is continuous positive airway pressure that prevents obstructive sleep apnea by keeping the throat from collapsing while you are sleeping. In most cases, the device is  smart  and can slowly self-adjusts if your throat collapses and keeps a record every day of how well you are treated-this information is available to you and your care team.  BPAP is bilevel positive airway pressure that keeps your throat open and also assists each breath with a pressure boost to maintain adequate breathing.  Special kinds of BPAP are used in patients who have inadequate breathing from lung or heart disease. In most cases, the device is  smart  and can slowly self-adjusts to assist breathing. Like CPAP, the device keeps a record of how well you are treated.  Your mask is your connection to the device. You get to choose what feels most comfortable and the staff will help to make sure if fits. Here: are some examples of the different masks that are available: Magnetic mask aids may assist with use but there are safety issues that should be addressed when considering with magnets* ( see end of discussion).       Key points to remember on your journey with sleep apnea:  Sleep study.  PAP devices often need to be adjusted during a sleep study to show that they are effective and adjusted right.  Good tips to remember: Try wearing just the mask during a quiet time during the day so your body adapts to wearing it. A humidifier is recommended for comfort in most  cases to prevent drying of your nose and throat. Allergy medication from your provider may help you if you are having nasal congestion.  Getting settled-in. It takes more than one night for most of us to get used to wearing a mask. Try wearing just the mask during a quiet time during the day so your body adapts to wearing it. A humidifier is recommended for comfort in most cases. Our team will work with you carefully on the first day and will be in contact within 4 days and again at 2 and 4 weeks for advice and remote device adjustments. Your therapy is evaluated by the device each day.   Use it every night. The more you are able to sleep naturally for 7-8 hours, the more likely you will have good sleep and to prevent health risks or symptoms from sleep apnea. Even if you use it 4 hours it helps. Occasionally all of us are unable to use a medical therapy, in sleep apnea, it is not dangerous to miss one night.   Communicate. Call our skilled team on the number provided on the first day if your visit for problems that make it difficult to wear the device. Over 2 out of 3 patients can learn to wear the device long-term with help from our team. Remember to call our team or your sleep providers if you are unable to wear the device as we may have other solutions for those who cannot adapt to mask CPAP therapy. It is recommended that you sleep your sleep provider within the first 3 months and yearly after that if you are not having problems.   Use it for your health. We encourage use of CPAP masks during daytime quiet periods to allow your face and brain to adapt to the sensation of CPAP so that it will be a more natural sensation to awaken to at night or during naps. This can be very useful during the first few weeks or months of adapting to CPAP though it does not help medically to wear CPAP during wakefulness and  should not be used as a strategy just to meet guidelines.  Take care of your equipment. Make sure you clean  your mask and tubing using directions every day and that your filter and mask are replaced as recommended or if they are not working.     *Masks with magnets:  Updated Contraindications  Masks with magnetic components are contraindicated for use by patients where they, or anyone in close physical contact while using the mask, have the following:   Active medical implants that interact with magnets (i.e., pacemakers, implantable cardioverter defibrillators (ICD), neurostimulators, cerebrospinal fluid (CSF) shunts, insulin/infusion pumps)   Metallic implants/objects containing ferromagnetic material (i.e., aneurysm clips/flow disruption devices, embolic coils, stents, valves, electrodes, implants to restore hearing or balance with implanted magnets, ocular implants, metallic splinters in the eye)  Updated Warning  Keep the mask magnets at a safe distance of at least 6 inches (150 mm) away from implants or medical devices that may be adversely affected by magnetic interference. This warning applies to you or anyone in close physical contact with your mask. The magnets are in the frame and lower headgear clips, with a magnetic field strength of up to 400mT. When worn, they connect to secure the mask but may inadvertently detach while asleep.  Implants/medical devices, including those listed within contraindications, may be adversely affected if they change function under external magnetic fields or contain ferromagnetic materials that attract/repel to magnetic fields (some metallic implants, e.g., contact lenses with metal, dental implants, metallic cranial plates, screws, jd hole covers, and bone substitute devices). Consult your physician and  of your implant / other medical device for information on the potential adverse effects of magnetic fields.    BESIDES CPAP, WHAT OTHER THERAPIES ARE THERE?    Positioning Device  Positioning devices are generally used when sleep apnea is mild and only occurs on  your back.This example shows a pillow that straps around the waist. It may be appropriate for those whose sleep study shows milder sleep apnea that occurs primarily when lying flat on one's back. Preliminary studies have shown benefit but effectiveness at home may need to be verified by a home sleep test. These devices are generally not covered by medical insurance.  Examples of devices that maintain sleeping on the back to prevent snoring and mild sleep apnea.    Belt type body positioner  http://[employer].Mar[employer].ws/    Electronic reminder  http://nightshifttherapy.com/            Oral Appliance  What is oral appliance therapy?  An oral appliance device fits on your teeth at night like a retainer used after having braces. The device is made by a specialized dentist and requires several visits over 1-2 months before a manufactured device is made to fit your teeth and is adjusted to prevent your sleep apnea. Once an oral device is working properly, snoring should be improved. A home sleep test may be recommended at that time if to determine whether the sleep apnea is adequately treated.       Some things to remember:  -Oral devices are often, but not always, covered by your medical insurance. Be sure to check with your insurance provider.   -If you are referred for oral therapy, you will be given a list of specialized dentists to consider or you may choose to visit the Web site of the American Academy of Dental Sleep Medicine  -Oral devices are less likely to work if you have severe sleep apnea or are extremely overweight.     More detailed information  An oral appliance is a small acrylic device that fits over the upper and lower teeth  (similar to a retainer or a mouth guard). This device slightly moves jaw forward, which moves the base of the tongue forward, opens the airway, improves breathing for effective treat snoring and obstructive sleep apnea in perhaps 7 out of 10 people .  The best working devices are  custom-made by a dental device  after a mold is made of the teeth 1, 2, 3.  When is an oral appliance indicated?  Oral appliance therapy is recommended as a first-line treatment for patients with primary snoring, mild sleep apnea, and for patients with moderate sleep apnea who prefer appliance therapy to use of CPAP4, 5. Severity of sleep apnea is determined by sleep testing and is based on the number of respiratory events per hour of sleep.   How successful is oral appliance therapy?  The success rate of oral appliance therapy in patients with mild sleep apnea is 75-80% while in patients with moderate sleep apnea it is 50-70%. The chance of success in patients with severe sleep apnea is 40-50%. The research also shows that oral appliances have a beneficial effect on the cardiovascular health of NELLY patients at the same magnitude as CPAP therapy7.  Oral appliances should be a second-line treatment in cases of severe sleep apnea, but if not completely successful then a combination therapy utilizing CPAP plus oral appliance therapy may be effective. Oral appliances tend to be effective in a broad range of patients although studies show that the patients who have the highest success are females, younger patients, those with milder disease, and less severe obesity. 3, 6.   Finding a dentist that practices dental sleep medicine  Specific training is available through the American Academy of Dental Sleep Medicine for dentists interested in working in the field of sleep. To find a dentist who is educated in the field of sleep and the use of oral appliances, near you, visit the Web site of the American Academy of Dental Sleep Medicine.    References  1. Samantha et al. Objectively measured vs self-reported compliance during oral appliance therapy for sleep-disordered breathing. Chest 2013; 144(5): 1111-8336.  2. Ji et al. Objective measurement of compliance during oral appliance therapy for  sleep-disordered breathing. Thorax 2013; 68(1): 91-96.  3. Kevan, et al. Mandibular advancement devices in 620 men and women with NELLY and snoring: tolerability and predictors of treatment success. Chest 2004; 125: 1337-8971.  4. Luis Enrique et al. Oral appliances for snoring and NELLY: a review. Sleep 2006; 29: 244-262.  5. Edmar et al. Oral appliance treatment for NELLY: an update. J Clin Sleep Med 2014; 10(2): 215-227.  6. Corky et al. Predictors of OSAH treatment outcome. J Dent Res 2007; 86: 1099-6583.      Weight Loss:   Your Body mass index is 34.7 kg/m .    Being overweight does not necessarily mean you will have health consequences.  Those who have BMI over 30 or over 27 with existing medical conditions carries greater risk.   Weight loss decreases severity of sleep apnea in most people with obesity. For those with mild obesity who have developed snoring with weight gain, even 15-30 pound weight loss can improve and occasionally milder eliminate sleep apnea.  Structured and life-long dietary and health habits are necessary to lose weight and keep healthier weight levels.     The Comprehensive Weight loss program offers all aspects of weight loss strategies including two Non-Surgical Weight Loss Programs: Medical Weight Management and our 24 Week Healthy Lifestyle Program:  Medical Weight Management: You will meet with a Medical Weight Management Provider, as well as a Registered Dietician. The program may include medication therapy, dietary education, recommended exercise and physical therapy programs, monthly support group meetings, and possible psychological counseling. Follow up visits with the provider or dietician are scheduled based on your progress and needs.  24 Week Healthy Lifestyle Program: This unique program is designed to give you the support of weekly appointments and activities thru a 24-week period. It may include all of the components of the basic program (above), with the addition of  11 individual Health  Visits, 24-week access to the [employer] website for over 700 online classes, and monthly support group meetings. This program has an out-of-pocket expense of $499 to cover the items that can not be billed to insurance (health coaches and [employer] access), and is non-refundable/non-transferable (you may be able to use a Health Savings Account; ask your HSA provider). There may be an optional meal replacement plan prescribed as well.   Medication therapy has been approved for the treatment of sleep apnea: The FDA approved tirzepatide (ZEPBOUND) for moderate to severe sleep apnea (apnea-hypopnea index greater than or equal to 15) in patients with BMI of greater than or equal to 30, or BMI greater than or equal to 27 with at least 1 weight-related condition such as hypertension or dyslipidemia.  Surgical management achieves meaningful long-term weight loss and improvement in health risks in most patients with more severe obesity.      Sleep Apnea Surgery:    Surgery for obstructive sleep apnea is considered generally only when other therapies fail to work. Surgery may be discussed with you if you are having a difficult time tolerating CPAP and or when there is an abnormal structure that requires surgical correction.  Nose and throat surgeries often enlarge the airway to prevent collapse.  Most of these surgeries create pain for 1-2 weeks and up to half of the most common surgeries are not effective throughout life.  You should carefully discuss the benefits and drawbacks to surgery with your sleep provider and surgeon to determine if it is the best solution for you.   More information  Surgery for NELLY is directed at areas that are responsible for narrowing or complete obstruction of the airway during sleep.  There are a wide range of procedures available to enlarge and/or stabilize the airway to prevent blockage of breathing in the three major areas where it can occur: the palate, tongue, and  nasal regions.  Successful surgical treatment depends on the accurate identification of the factors responsible for obstructive sleep apnea in each person.  A personalized approach is required because there is no single treatment that works well for everyone.  Because of anatomic variation, consultation with an examination by a sleep surgeon is a critical first step in determining what surgical options are best for each patient.  In some cases, examination during sedation may be recommended in order to guide the selection of procedures.  Patients will be counseled about risks and benefits as well as the typical recovery course after surgery. Surgery is typically not a cure for a person s NELLY.  However, surgery will often significantly improve one s NELLY severity (termed  success rate ).  Even in the absence of a cure, surgery will decrease the cardiovascular risk associated with OSA7; improve overall quality of life8 (sleepiness, functionality, sleep quality, etc).      Palate Procedures:  Patients with NELLY often have narrowing of their airway in the region of their tonsils and uvula.  The goals of palate procedures are to widen the airway in this region as well as to help the tissues resist collapse.  Modern palate procedure techniques focus on tissue conservation and soft tissue rearrangement, rather than tissue removal.  Often the uvula is preserved in this procedure. Residual sleep apnea is common in patient after pharyngoplasty with an average reduction in sleep apnea events of 33%2.      Tongue Procedures:  ExamWhile patients are awake, the muscles that surround the throat are active and keep this region open for breathing. These muscles relax during sleep, allowing the tongue and other structures to collapse and block breathing.  There are several different tongue procedures available.  Selection of a tongue base procedure depends on characteristics seen on physical exam.  Generally, procedures are aimed at  removing bulky tissues in this area or preventing the back of the tongue from falling back during sleep.  Success rates for tongue surgery range from 50-62%3.    Hypoglossal Nerve Stimulation:  Hypoglossal nerve stimulation has recently received approval from the [place] Food and Drug Administration for the treatment of obstructive sleep apnea.  This is based on research showing that the system was safe and effective in treating sleep apnea6.  Results showed that the median AHI score decreased 68%, from 29.3 to 9.0. This therapy uses an implant system that senses breathing patterns and delivers mild stimulation to airway muscles, which keeps the airway open during sleep.  The system consists of three fully implanted components: a small generator (similar in size to a pacemaker), a breathing sensor, and a stimulation lead.  Using a small handheld remote, a patient turns the therapy on before bed and off upon awakening.    Candidates for this device must be greater than 18 years of age, have moderate to severe obstructive sleep apnea with less than 25% central events  (AHI between 15-65), BMI less than 35, have tried CPAP/oral appliance for at least 8 weeks without success, and have appropriate upper airway anatomy (determined by a sleep endoscopy performed by Dr. Ramo Burgess or Dr. Kee Noble).     Nasal Procedures:  Nasal obstruction can interfere with nasal breathing during the day and night.  Studies have shown that relief of nasal obstruction can improve the ability of some patients to tolerate positive airway pressure therapy for obstructive sleep apnea1.  Treatment options include medications such as nasal saline, topical corticosteroid and antihistamine sprays, and oral medications such as antihistamines or decongestants. Non-surgical treatments can include external nasal dilators for selected patients. If these are not successful by themselves, surgery can improve the nasal airway either alone or in  combination with these other options.        Combination Procedures:  Combination of surgical procedures and other treatments may be recommended, particularly if patients have more than one area of narrowing or persistent positional disease.  The success rate of combination surgery ranges from 66-80%2,3.    References  Vikki CARTER. The Role of the Nose in Snoring and Obstructive Sleep Apnoea: An Update.  Eur Arch Otorhinolaryngol. 2011; 268: 1365-73.   Yandy SM; Brandi JA; Jason JR; Pallanch JF; Karen MB; Radha SG; Caleb BANDA. Surgical modifications of the upper airway for obstructive sleep apnea in adults: a systematic review and meta-analysis. SLEEP 2010;33(10):0364-3706. Oliva CARR. Hypopharyngeal surgery in obstructive sleep apnea: an evidence-based medicine review.  Arch Otolaryngol Head Neck Surg. 2006 Feb;132(2):206-13.  Seth YH1, Margaret Y, Eliezer KATHIA. The efficacy of anatomically based multilevel surgery for obstructive sleep apnea. Otolaryngol Head Neck Surg. 2003 Oct;129(4):327-35.  Kezirian E, Goldberg A. Hypopharyngeal Surgery in Obstructive Sleep Apnea: An Evidence-Based Medicine Review. Arch Otolaryngol Head Neck Surg. 2006 Feb;132(2):206-13.  Mary BOWLING et al. Upper-Airway Stimulation for Obstructive Sleep Apnea.  N Engl J Med. 2014 Jan 9;370(2):139-49.  Charlee Y et al. Increased Incidence of Cardiovascular Disease in Middle-aged Men with Obstructive Sleep Apnea. Am J Respir Crit Care Med; 2002 166: 159-165  Moon EM et al. Studying Life Effects and Effectiveness of Palatopharyngoplasty (SLEEP) study: Subjective Outcomes of Isolated Uvulopalatopharyngoplasty. Otolaryngol Head Neck Surg. 2011; 144: 623-631.        WHAT IF I ONLY HAVE SNORING?    Mandibular advancement devices, lateral sleep positioning, long-term weight loss and treatment of nasal allergies have been shown to improve snoring.  Exercising tongue muscles with a game (https://apps.[employer]/us/amish/soundly-reduce-snoring/no6021359872) or  stimulating the tongue during the day with a device (https://doi.org/10.3390/mzm84425960) have improved snoring in some individuals.  https://www.[employer]/  https://www.sleepfoundation.org/best-anti-snoring-mouthpieces-and-mouthguards    Remember to Drive Safe... Drive Alive     Sleep health profoundly affects your health, mood, and your safety.  Thirty three percent of the population (one in three of us) is not getting enough sleep and many have a sleep disorder. Not getting enough sleep or having an untreated / undertreated sleep condition may make us sleepy without even knowing it. In fact, our driving could be dramatically impaired due to our sleep health. As your provider, here are some things I would like you to know about driving:     Here are some warning signs for impairment and dangerous drowsy driving:              -Having been awake more than 16 hours               -Looking tired               -Eyelid drooping              -Head nodding (it could be too late at this point)              -Driving for more than 30 minutes     Some things you could do to make the driving safer if you are experiencing some drowsiness:              -Stop driving and rest              -Call for transportation              -Make sure your sleep disorder is adequately treated     Some things that have been shown NOT to work when experiencing drowsiness while driving:              -Turning on the radio              -Opening windows              -Eating any  distracting  /  entertaining  foods (e.g., sunflower seeds, candy, or any other)              -Talking on the phone      Your decision may not only impact your life, but also the life of others. Please, remember to drive safe for yourself and all of us.

## 2025-08-18 ENCOUNTER — TELEPHONE (OUTPATIENT)
Dept: SLEEP MEDICINE | Facility: CLINIC | Age: 55
End: 2025-08-18
Payer: COMMERCIAL